# Patient Record
Sex: FEMALE | Race: WHITE | NOT HISPANIC OR LATINO | Employment: PART TIME | URBAN - METROPOLITAN AREA
[De-identification: names, ages, dates, MRNs, and addresses within clinical notes are randomized per-mention and may not be internally consistent; named-entity substitution may affect disease eponyms.]

---

## 2023-02-24 LAB — LAB MOLECULAR CA TECHNICAL NOTES: NORMAL

## 2023-03-28 LAB
ANION GAP IN SER/PLAS: 13 MMOL/L (ref 10–20)
CALCIUM (MG/DL) IN SER/PLAS: 9.5 MG/DL (ref 8.6–10.3)
CARBON DIOXIDE, TOTAL (MMOL/L) IN SER/PLAS: 23 MMOL/L (ref 21–32)
CHLORIDE (MMOL/L) IN SER/PLAS: 104 MMOL/L (ref 98–107)
CREATININE (MG/DL) IN SER/PLAS: 0.58 MG/DL (ref 0.5–1.05)
GFR FEMALE: >90 ML/MIN/1.73M2
GLUCOSE (MG/DL) IN SER/PLAS: 90 MG/DL (ref 74–99)
POTASSIUM (MMOL/L) IN SER/PLAS: 3.6 MMOL/L (ref 3.5–5.3)
SODIUM (MMOL/L) IN SER/PLAS: 136 MMOL/L (ref 136–145)
UREA NITROGEN (MG/DL) IN SER/PLAS: 6 MG/DL (ref 6–23)

## 2023-05-19 LAB — URINE CULTURE: NORMAL

## 2023-05-23 LAB
ERYTHROCYTE DISTRIBUTION WIDTH (RATIO) BY AUTOMATED COUNT: 12.9 % (ref 11.5–14.5)
ERYTHROCYTE MEAN CORPUSCULAR HEMOGLOBIN CONCENTRATION (G/DL) BY AUTOMATED: 31.2 G/DL (ref 32–36)
ERYTHROCYTE MEAN CORPUSCULAR VOLUME (FL) BY AUTOMATED COUNT: 89 FL (ref 80–100)
ERYTHROCYTES (10*6/UL) IN BLOOD BY AUTOMATED COUNT: 3.94 X10E12/L (ref 4–5.2)
GLUCOSE, 1 HR SCREEN, PREG: 145 MG/DL
HEMATOCRIT (%) IN BLOOD BY AUTOMATED COUNT: 34.9 % (ref 36–46)
HEMOGLOBIN (G/DL) IN BLOOD: 10.9 G/DL (ref 12–16)
LEUKOCYTES (10*3/UL) IN BLOOD BY AUTOMATED COUNT: 10.6 X10E9/L (ref 4.4–11.3)
PLATELETS (10*3/UL) IN BLOOD AUTOMATED COUNT: 293 X10E9/L (ref 150–450)
REFLEX ADDED, ANEMIA PANEL: ABNORMAL

## 2023-05-24 LAB
FERRITIN, PREGNANCY: 26 UG/L
FOLATE, SERUM, PREGNANCY: >24 NG/ML
IRON (UG/DL) IN SER/PLAS IN PREGNANCY: 52 UG/DL
IRON BINDING CAPACITY (UG/DL) IN PREGNANCY: >502 UG/DL
IRON SATURATION (%) IN PREGNANCY: ABNORMAL %
VITAMIN B12, PREGNANCY: 368 PG/ML

## 2023-06-20 ENCOUNTER — TELEPHONE (OUTPATIENT)
Dept: PRIMARY CARE | Facility: CLINIC | Age: 28
End: 2023-06-20
Payer: COMMERCIAL

## 2023-06-20 DIAGNOSIS — F41.9 ANXIETY: Primary | ICD-10-CM

## 2023-06-20 RX ORDER — VENLAFAXINE HYDROCHLORIDE 75 MG/1
75 CAPSULE, EXTENDED RELEASE ORAL DAILY
Qty: 90 CAPSULE | Refills: 0 | Status: SHIPPED | OUTPATIENT
Start: 2023-06-20 | End: 2023-09-18

## 2023-06-20 RX ORDER — VENLAFAXINE HYDROCHLORIDE 75 MG/1
75 CAPSULE, EXTENDED RELEASE ORAL
COMMUNITY
End: 2023-06-20 | Stop reason: SDUPTHER

## 2023-06-27 LAB
GLUCOSE THREE HOUR: 102 MG/DL
GLUCOSE TWO HOUR: 95 MG/DL
GLUCOSE, FASTING: 83 MG/DL
GLUCOSE, ONE HOUR: 163 MG/DL
GTTCM: NORMAL

## 2023-08-04 LAB — GROUP B STREP SCREEN: NORMAL

## 2023-08-18 ENCOUNTER — APPOINTMENT (OUTPATIENT)
Dept: LAB | Facility: LAB | Age: 28
End: 2023-08-18
Payer: COMMERCIAL

## 2023-08-18 LAB
ABO GROUP (TYPE) IN BLOOD: NORMAL
ANION GAP IN SER/PLAS: 17 MMOL/L (ref 10–20)
ANTIBODY SCREEN: NORMAL
CALCIUM (MG/DL) IN SER/PLAS: 9 MG/DL (ref 8.6–10.6)
CARBON DIOXIDE, TOTAL (MMOL/L) IN SER/PLAS: 21 MMOL/L (ref 21–32)
CHLORIDE (MMOL/L) IN SER/PLAS: 106 MMOL/L (ref 98–107)
CREATININE (MG/DL) IN SER/PLAS: 0.5 MG/DL (ref 0.5–1.05)
ERYTHROCYTE DISTRIBUTION WIDTH (RATIO) BY AUTOMATED COUNT: 15.3 % (ref 11.5–14.5)
ERYTHROCYTE MEAN CORPUSCULAR HEMOGLOBIN CONCENTRATION (G/DL) BY AUTOMATED: 30.7 G/DL (ref 32–36)
ERYTHROCYTE MEAN CORPUSCULAR VOLUME (FL) BY AUTOMATED COUNT: 85 FL (ref 80–100)
ERYTHROCYTES (10*6/UL) IN BLOOD BY AUTOMATED COUNT: 4.08 X10E12/L (ref 4–5.2)
GFR FEMALE: >90 ML/MIN/1.73M2
GLUCOSE (MG/DL) IN SER/PLAS: 79 MG/DL (ref 74–99)
HEMATOCRIT (%) IN BLOOD BY AUTOMATED COUNT: 34.5 % (ref 36–46)
HEMOGLOBIN (G/DL) IN BLOOD: 10.6 G/DL (ref 12–16)
LEUKOCYTES (10*3/UL) IN BLOOD BY AUTOMATED COUNT: 11 X10E9/L (ref 4.4–11.3)
NRBC (PER 100 WBCS) BY AUTOMATED COUNT: 0 /100 WBC (ref 0–0)
PLATELETS (10*3/UL) IN BLOOD AUTOMATED COUNT: 247 X10E9/L (ref 150–450)
POTASSIUM (MMOL/L) IN SER/PLAS: 4.3 MMOL/L (ref 3.5–5.3)
RH FACTOR: NORMAL
SODIUM (MMOL/L) IN SER/PLAS: 140 MMOL/L (ref 136–145)
SYPHILIS TOTAL AB: NONREACTIVE
THYROTROPIN (MIU/L) IN SER/PLAS BY DETECTION LIMIT <= 0.05 MIU/L: 3.22 MIU/L (ref 0.44–3.98)
THYROXINE (T4) FREE (NG/DL) IN SER/PLAS: 0.97 NG/DL (ref 0.78–1.48)
UREA NITROGEN (MG/DL) IN SER/PLAS: 7 MG/DL (ref 6–23)

## 2023-09-18 DIAGNOSIS — F41.9 ANXIETY: ICD-10-CM

## 2023-09-18 RX ORDER — VENLAFAXINE HYDROCHLORIDE 75 MG/1
75 CAPSULE, EXTENDED RELEASE ORAL DAILY
Qty: 90 CAPSULE | Refills: 0 | Status: SHIPPED | OUTPATIENT
Start: 2023-09-18 | End: 2023-11-06 | Stop reason: SDUPTHER

## 2023-09-29 PROBLEM — R00.2 PALPITATIONS: Status: ACTIVE | Noted: 2023-09-29

## 2023-09-29 PROBLEM — M75.81 TENDINITIS OF RIGHT ROTATOR CUFF: Status: ACTIVE | Noted: 2023-09-29

## 2023-09-29 PROBLEM — J18.9 COMMUNITY ACQUIRED PNEUMONIA: Status: ACTIVE | Noted: 2023-09-29

## 2023-09-29 PROBLEM — N89.8 VAGINAL DISCHARGE: Status: ACTIVE | Noted: 2023-09-29

## 2023-09-29 PROBLEM — R41.840 POOR CONCENTRATION: Status: ACTIVE | Noted: 2023-09-29

## 2023-09-29 PROBLEM — E53.8 VITAMIN B12 DEFICIENCY: Status: ACTIVE | Noted: 2023-09-29

## 2023-09-29 PROBLEM — G90.9 DYSFUNCTIONAL AUTONOMIC NERVOUS SYSTEM: Status: ACTIVE | Noted: 2023-09-29

## 2023-09-29 PROBLEM — O09.291: Status: ACTIVE | Noted: 2019-11-18

## 2023-09-29 PROBLEM — G93.40 ENCEPHALOPATHY: Status: ACTIVE | Noted: 2023-09-29

## 2023-09-29 PROBLEM — R35.0 INCREASED FREQUENCY OF URINATION: Status: ACTIVE | Noted: 2023-09-29

## 2023-09-29 PROBLEM — F41.9 ANXIETY AND DEPRESSION: Status: ACTIVE | Noted: 2023-08-23

## 2023-09-29 PROBLEM — R06.02 SHORTNESS OF BREATH: Status: ACTIVE | Noted: 2023-09-29

## 2023-09-29 PROBLEM — R26.9 ABNORMAL GAIT: Status: ACTIVE | Noted: 2023-09-29

## 2023-09-29 PROBLEM — F32.A ANXIETY AND DEPRESSION: Status: ACTIVE | Noted: 2023-08-23

## 2023-09-29 PROBLEM — N92.6 MISSED PERIOD: Status: ACTIVE | Noted: 2023-09-29

## 2023-09-29 PROBLEM — G93.9 DISORDER OF BRAIN: Status: ACTIVE | Noted: 2023-09-29

## 2023-09-29 PROBLEM — D64.9 ANEMIA: Status: ACTIVE | Noted: 2023-09-29

## 2023-09-29 PROBLEM — R41.89 IMPAIRED COGNITION: Status: ACTIVE | Noted: 2023-09-29

## 2023-09-29 PROBLEM — I47.11 INAPPROPRIATE SINUS TACHYCARDIA (CMS-HCC): Status: ACTIVE | Noted: 2023-09-29

## 2023-09-29 PROBLEM — U09.9 POST COVID-19 CONDITION, UNSPECIFIED: Status: ACTIVE | Noted: 2023-08-23

## 2023-09-29 PROBLEM — G47.9 SLEEP DIFFICULTIES: Status: ACTIVE | Noted: 2023-09-29

## 2023-09-29 PROBLEM — G90.1 DYSAUTONOMIA (MULTI): Status: ACTIVE | Noted: 2023-09-29

## 2023-09-29 PROBLEM — G83.10 PARESIS OF LOWER EXTREMITY (MULTI): Status: ACTIVE | Noted: 2023-09-29

## 2023-09-29 PROBLEM — I95.9 HYPOTENSION: Status: ACTIVE | Noted: 2023-09-29

## 2023-09-29 PROBLEM — G47.01 INSOMNIA DUE TO MEDICAL CONDITION: Status: ACTIVE | Noted: 2023-09-29

## 2023-09-29 PROBLEM — E55.9 VITAMIN D DEFICIENCY: Status: ACTIVE | Noted: 2023-09-29

## 2023-09-29 PROBLEM — B34.9 NONSPECIFIC SYNDROME SUGGESTIVE OF VIRAL ILLNESS: Status: ACTIVE | Noted: 2023-09-29

## 2023-09-29 PROBLEM — R00.0 SINUS TACHYCARDIA: Status: ACTIVE | Noted: 2023-09-29

## 2023-09-29 PROBLEM — R50.9 LOW GRADE FEVER: Status: ACTIVE | Noted: 2023-09-29

## 2023-09-29 PROBLEM — K21.9 GASTROESOPHAGEAL REFLUX DISEASE: Status: ACTIVE | Noted: 2023-09-29

## 2023-09-29 PROBLEM — E66.3 OVERWEIGHT WITH BODY MASS INDEX (BMI) 25.0-29.9: Status: ACTIVE | Noted: 2023-09-29

## 2023-09-29 PROBLEM — R20.0 NUMBNESS AND TINGLING: Status: ACTIVE | Noted: 2023-09-29

## 2023-09-29 PROBLEM — F90.0 ATTENTION DEFICIT HYPERACTIVITY DISORDER (ADHD), PREDOMINANTLY INATTENTIVE TYPE: Status: ACTIVE | Noted: 2023-09-29

## 2023-09-29 PROBLEM — E66.9 OBESITY: Status: ACTIVE | Noted: 2023-09-29

## 2023-09-29 PROBLEM — R41.3 MEMORY LOSS OF UNKNOWN CAUSE: Status: ACTIVE | Noted: 2023-09-29

## 2023-09-29 PROBLEM — S46.919A STRAIN OF SHOULDER: Status: ACTIVE | Noted: 2023-09-29

## 2023-09-29 PROBLEM — J45.30 MILD PERSISTENT ASTHMA WITHOUT COMPLICATION (HHS-HCC): Status: ACTIVE | Noted: 2021-02-17

## 2023-09-29 PROBLEM — R20.2 NUMBNESS AND TINGLING: Status: ACTIVE | Noted: 2023-09-29

## 2023-09-29 PROBLEM — M50.20 HERNIATION OF INTERVERTEBRAL DISC OF CERVICAL REGION: Status: ACTIVE | Noted: 2023-09-29

## 2023-09-29 RX ORDER — NORETHINDRONE 0.35 MG/1
1 TABLET ORAL DAILY
COMMUNITY
Start: 2023-08-22 | End: 2023-10-03

## 2023-09-29 RX ORDER — HYDROXYZINE HYDROCHLORIDE 25 MG/1
25 TABLET, FILM COATED ORAL NIGHTLY PRN
COMMUNITY
Start: 2020-12-08 | End: 2023-10-03

## 2023-09-29 RX ORDER — ALBUTEROL SULFATE 0.83 MG/ML
2.5 SOLUTION RESPIRATORY (INHALATION) EVERY 6 HOURS PRN
COMMUNITY
End: 2023-12-26

## 2023-09-29 RX ORDER — LEVALBUTEROL INHALATION SOLUTION 1.25 MG/3ML
3 SOLUTION RESPIRATORY (INHALATION) EVERY 8 HOURS
COMMUNITY
Start: 2021-07-30 | End: 2023-12-29 | Stop reason: WASHOUT

## 2023-09-29 RX ORDER — CEPHALEXIN 500 MG/1
500 CAPSULE ORAL 3 TIMES DAILY
COMMUNITY
Start: 2020-08-17 | End: 2023-10-03

## 2023-09-29 RX ORDER — METOCLOPRAMIDE HYDROCHLORIDE 5 MG/5ML
25 SOLUTION ORAL
COMMUNITY
End: 2023-10-03

## 2023-09-29 RX ORDER — BROMPHENIRAMINE MALEATE, PSEUDOEPHEDRINE HYDROCHLORIDE, AND DEXTROMETHORPHAN HYDROBROMIDE 2; 30; 10 MG/5ML; MG/5ML; MG/5ML
5 SYRUP ORAL 3 TIMES DAILY PRN
COMMUNITY
Start: 2019-09-11 | End: 2023-10-03

## 2023-09-29 RX ORDER — MAGNESIUM 200 MG
TABLET ORAL
COMMUNITY
Start: 2022-09-12 | End: 2023-10-03

## 2023-09-29 RX ORDER — TIZANIDINE 2 MG/1
TABLET ORAL
COMMUNITY
Start: 2021-02-01 | End: 2023-10-03

## 2023-09-29 RX ORDER — BUDESONIDE 0.5 MG/2ML
INHALANT ORAL
COMMUNITY
Start: 2021-02-17 | End: 2023-10-03

## 2023-09-29 RX ORDER — SERDEXMETHYLPHENIDATE AND DEXMETHYLPHENIDATE 7.8; 39.2 MG/1; MG/1
CAPSULE ORAL
COMMUNITY
Start: 2022-10-20 | End: 2023-10-03

## 2023-09-29 RX ORDER — MIRTAZAPINE 15 MG/1
15 TABLET, FILM COATED ORAL NIGHTLY
COMMUNITY
Start: 2021-08-24 | End: 2023-10-03

## 2023-09-29 RX ORDER — LANOLIN ALCOHOL/MO/W.PET/CERES
1 CREAM (GRAM) TOPICAL 2 TIMES DAILY
COMMUNITY
Start: 2023-06-27 | End: 2023-12-26 | Stop reason: WASHOUT

## 2023-09-29 RX ORDER — PANTOPRAZOLE SODIUM 40 MG/1
1 TABLET, DELAYED RELEASE ORAL DAILY
COMMUNITY
Start: 2023-08-01 | End: 2023-10-03

## 2023-09-29 RX ORDER — MECLIZINE HYDROCHLORIDE 50 MG/1
50 TABLET ORAL 3 TIMES DAILY
COMMUNITY
Start: 2021-12-08 | End: 2023-12-26

## 2023-09-29 RX ORDER — ONDANSETRON 4 MG/1
8 TABLET, FILM COATED ORAL 3 TIMES DAILY PRN
COMMUNITY
Start: 2021-12-08 | End: 2023-10-03

## 2023-09-29 RX ORDER — LEVALBUTEROL TARTRATE 45 UG/1
2 AEROSOL, METERED ORAL
COMMUNITY
Start: 2020-02-05

## 2023-09-29 RX ORDER — FERROUS GLUCONATE 324(37.5)
38 TABLET ORAL EVERY OTHER DAY
COMMUNITY
Start: 2020-01-16 | End: 2023-12-26

## 2023-09-29 RX ORDER — ONDANSETRON 8 MG/1
8 TABLET, ORALLY DISINTEGRATING ORAL EVERY 8 HOURS PRN
COMMUNITY
Start: 2021-12-08 | End: 2023-10-03

## 2023-09-29 RX ORDER — OMEPRAZOLE 20 MG/1
20 CAPSULE, DELAYED RELEASE ORAL
COMMUNITY
Start: 2022-07-05 | End: 2023-10-03

## 2023-09-29 RX ORDER — FLUTICASONE FUROATE AND VILANTEROL 200; 25 UG/1; UG/1
1 POWDER RESPIRATORY (INHALATION)
COMMUNITY
Start: 2020-12-07 | End: 2023-12-29 | Stop reason: WASHOUT

## 2023-09-29 RX ORDER — IPRATROPIUM BROMIDE AND ALBUTEROL SULFATE 2.5; .5 MG/3ML; MG/3ML
3 SOLUTION RESPIRATORY (INHALATION)
COMMUNITY
Start: 2020-01-02 | End: 2023-10-03

## 2023-09-29 RX ORDER — LEVALBUTEROL TARTRATE 45 UG/1
2 AEROSOL, METERED ORAL EVERY 6 HOURS PRN
COMMUNITY
Start: 2021-07-30 | End: 2023-10-03

## 2023-09-29 RX ORDER — FLUTICASONE PROPIONATE 50 MCG
1-2 SPRAY, SUSPENSION (ML) NASAL DAILY
COMMUNITY
Start: 2019-09-11 | End: 2023-10-03

## 2023-09-29 RX ORDER — FERROUS SULFATE TAB 325 MG (65 MG ELEMENTAL FE) 325 (65 FE) MG
2 TAB ORAL DAILY
COMMUNITY
End: 2024-05-14 | Stop reason: SDUPTHER

## 2023-09-29 RX ORDER — CHOLECALCIFEROL (VITAMIN D3) 25 MCG
1 TABLET,CHEWABLE ORAL DAILY
COMMUNITY
Start: 2020-01-16 | End: 2023-10-03

## 2023-09-29 RX ORDER — DESVENLAFAXINE 50 MG/1
50 TABLET, EXTENDED RELEASE ORAL DAILY
COMMUNITY
Start: 2019-05-06 | End: 2023-10-03

## 2023-09-29 RX ORDER — FAMOTIDINE 20 MG/1
20 TABLET, FILM COATED ORAL NIGHTLY
COMMUNITY
End: 2023-10-03

## 2023-09-29 RX ORDER — FLUDROCORTISONE ACETATE 0.1 MG/1
0.1 TABLET ORAL 2 TIMES DAILY
COMMUNITY
Start: 2022-02-08 | End: 2023-10-03

## 2023-09-29 RX ORDER — NORETHINDRONE ACETATE AND ETHINYL ESTRADIOL 1.5-30(21)
KIT ORAL
COMMUNITY
Start: 2016-10-01 | End: 2023-10-03

## 2023-09-29 RX ORDER — ACETAMINOPHEN 325 MG/1
TABLET ORAL
COMMUNITY
Start: 2020-06-28 | End: 2023-10-03

## 2023-09-29 RX ORDER — IRON PS COMPLEX/B12/FOLIC ACID 150-25-1
1 CAPSULE ORAL DAILY
COMMUNITY
End: 2023-10-03

## 2023-09-29 RX ORDER — DOCUSATE SODIUM 100 MG/1
CAPSULE, LIQUID FILLED ORAL
COMMUNITY
Start: 2023-08-22 | End: 2023-10-03

## 2023-09-29 RX ORDER — NORETHINDRONE ACETATE AND ETHINYL ESTRADIOL, AND FERROUS FUMARATE 1MG-20(24)
1 KIT ORAL
COMMUNITY
Start: 2019-06-24 | End: 2023-10-03

## 2023-09-29 RX ORDER — VIT C/E/ZN/COPPR/LUTEIN/ZEAXAN 250MG-90MG
50 CAPSULE ORAL 2 TIMES DAILY
COMMUNITY
Start: 2022-07-19

## 2023-09-29 RX ORDER — NITROFURANTOIN (MACROCRYSTALS) 100 MG/1
100 CAPSULE ORAL EVERY 12 HOURS
COMMUNITY
Start: 2020-01-27 | End: 2023-10-03

## 2023-09-29 RX ORDER — ACEBUTOLOL HYDROCHLORIDE 200 MG/1
200 CAPSULE ORAL 2 TIMES DAILY
COMMUNITY
End: 2024-04-18

## 2023-09-29 RX ORDER — IBUPROFEN 600 MG/1
TABLET ORAL
COMMUNITY
Start: 2020-06-28 | End: 2023-10-03

## 2023-09-29 RX ORDER — PROMETHAZINE HYDROCHLORIDE 12.5 MG/1
1 TABLET ORAL EVERY 6 HOURS PRN
COMMUNITY
Start: 2023-01-16 | End: 2023-10-03

## 2023-09-29 RX ORDER — METOCLOPRAMIDE 5 MG/1
5 TABLET ORAL 4 TIMES DAILY
COMMUNITY
Start: 2019-12-04 | End: 2023-10-03

## 2023-10-03 ENCOUNTER — OFFICE VISIT (OUTPATIENT)
Dept: OBSTETRICS AND GYNECOLOGY | Facility: CLINIC | Age: 28
End: 2023-10-03
Payer: COMMERCIAL

## 2023-10-03 ENCOUNTER — TELEPHONE (OUTPATIENT)
Dept: OBSTETRICS AND GYNECOLOGY | Facility: CLINIC | Age: 28
End: 2023-10-03

## 2023-10-03 VITALS — SYSTOLIC BLOOD PRESSURE: 116 MMHG | BODY MASS INDEX: 29.88 KG/M2 | DIASTOLIC BLOOD PRESSURE: 78 MMHG | WEIGHT: 196.5 LBS

## 2023-10-03 NOTE — PROGRESS NOTES
Postpartum Progress Note    Assessment/Plan   Sienna Cabrera is a 27 y.o., G3.P3 presenting for 6 wk postpartum visit after .  Doing well.  Infant doing well.  Up to date on pap.  Mood is good.  Declines birth control.   RTC 6-9 months for annual exam or earlier with any concerns.    Raeann Jimenez MD    Active Problems:  There are no active Hospital Problems.    Problem List       No episode was linked to this visit.          Hospital course: no complications       Subjective   She reports no breast or nursing problems  She denies emotional concerns today   Her plan for contraception is none     Patient feeling well.  Infant doing well.     Objective   Allergies:   Escitalopram, Lactose, and Metoprolol         Last Vitals:  Temp Pulse Resp BP MAP Pulse Ox         116/78           Physical Exam:  General: Examination reveals a well developed, well nourished, female, in no acute distress. She is alert and cooperative.  Perineum: well approximated and and well healed.    Lab Data:  Labs in chart were reviewed.

## 2023-10-15 ENCOUNTER — APPOINTMENT (OUTPATIENT)
Dept: RADIOLOGY | Facility: HOSPITAL | Age: 28
End: 2023-10-15
Payer: COMMERCIAL

## 2023-10-15 ENCOUNTER — HOSPITAL ENCOUNTER (EMERGENCY)
Facility: HOSPITAL | Age: 28
Discharge: HOME | End: 2023-10-15
Attending: PHYSICIAN ASSISTANT
Payer: COMMERCIAL

## 2023-10-15 VITALS
BODY MASS INDEX: 29.55 KG/M2 | WEIGHT: 195 LBS | RESPIRATION RATE: 18 BRPM | DIASTOLIC BLOOD PRESSURE: 79 MMHG | SYSTOLIC BLOOD PRESSURE: 140 MMHG | TEMPERATURE: 97.8 F | HEIGHT: 68 IN | OXYGEN SATURATION: 97 % | HEART RATE: 74 BPM

## 2023-10-15 DIAGNOSIS — R07.89 ACUTE CHEST WALL PAIN: ICD-10-CM

## 2023-10-15 DIAGNOSIS — R07.89 OTHER CHEST PAIN: Primary | ICD-10-CM

## 2023-10-15 DIAGNOSIS — E87.6 HYPOKALEMIA: ICD-10-CM

## 2023-10-15 LAB
ALBUMIN SERPL BCP-MCNC: 4.8 G/DL (ref 3.4–5)
ALP SERPL-CCNC: 118 U/L (ref 33–110)
ALT SERPL W P-5'-P-CCNC: 46 U/L (ref 7–45)
ANION GAP SERPL CALC-SCNC: 15 MMOL/L (ref 10–20)
APTT PPP: 26 SECONDS (ref 27–38)
AST SERPL W P-5'-P-CCNC: 50 U/L (ref 9–39)
B-HCG SERPL-ACNC: <2 MIU/ML
BASOPHILS # BLD AUTO: 0.07 X10*3/UL (ref 0–0.1)
BASOPHILS NFR BLD AUTO: 0.7 %
BILIRUB SERPL-MCNC: 0.4 MG/DL (ref 0–1.2)
BUN SERPL-MCNC: 9 MG/DL (ref 6–23)
CALCIUM SERPL-MCNC: 9.9 MG/DL (ref 8.6–10.3)
CARDIAC TROPONIN I PNL SERPL HS: <3 NG/L (ref 0–13)
CARDIAC TROPONIN I PNL SERPL HS: <3 NG/L (ref 0–13)
CHLORIDE SERPL-SCNC: 102 MMOL/L (ref 98–107)
CO2 SERPL-SCNC: 25 MMOL/L (ref 21–32)
CREAT SERPL-MCNC: 0.75 MG/DL (ref 0.5–1.05)
D DIMER PPP FEU-MCNC: 353 NG/ML FEU
EOSINOPHIL # BLD AUTO: 0.21 X10*3/UL (ref 0–0.7)
EOSINOPHIL NFR BLD AUTO: 2.1 %
ERYTHROCYTE [DISTWIDTH] IN BLOOD BY AUTOMATED COUNT: 14.2 % (ref 11.5–14.5)
GFR SERPL CREATININE-BSD FRML MDRD: >90 ML/MIN/1.73M*2
GLUCOSE SERPL-MCNC: 99 MG/DL (ref 74–99)
HCT VFR BLD AUTO: 43.2 % (ref 36–46)
HGB BLD-MCNC: 14.1 G/DL (ref 12–16)
IMM GRANULOCYTES # BLD AUTO: 0.03 X10*3/UL (ref 0–0.7)
IMM GRANULOCYTES NFR BLD AUTO: 0.3 % (ref 0–0.9)
INR PPP: 0.9 (ref 0.9–1.1)
LIPASE SERPL-CCNC: 47 U/L (ref 9–82)
LYMPHOCYTES # BLD AUTO: 2.74 X10*3/UL (ref 1.2–4.8)
LYMPHOCYTES NFR BLD AUTO: 27.9 %
MAGNESIUM SERPL-MCNC: 1.86 MG/DL (ref 1.6–2.4)
MCH RBC QN AUTO: 26.8 PG (ref 26–34)
MCHC RBC AUTO-ENTMCNC: 32.6 G/DL (ref 32–36)
MCV RBC AUTO: 82 FL (ref 80–100)
MONOCYTES # BLD AUTO: 0.44 X10*3/UL (ref 0.1–1)
MONOCYTES NFR BLD AUTO: 4.5 %
NEUTROPHILS # BLD AUTO: 6.34 X10*3/UL (ref 1.2–7.7)
NEUTROPHILS NFR BLD AUTO: 64.5 %
NRBC BLD-RTO: 0 /100 WBCS (ref 0–0)
PLATELET # BLD AUTO: 287 X10*3/UL (ref 150–450)
PMV BLD AUTO: 10.1 FL (ref 7.5–11.5)
POTASSIUM SERPL-SCNC: 3.4 MMOL/L (ref 3.5–5.3)
PROT SERPL-MCNC: 7.7 G/DL (ref 6.4–8.2)
PROTHROMBIN TIME: 10.2 SECONDS (ref 9.8–12.8)
RBC # BLD AUTO: 5.27 X10*6/UL (ref 4–5.2)
SODIUM SERPL-SCNC: 139 MMOL/L (ref 136–145)
WBC # BLD AUTO: 9.8 X10*3/UL (ref 4.4–11.3)

## 2023-10-15 PROCEDURE — 36415 COLL VENOUS BLD VENIPUNCTURE: CPT

## 2023-10-15 PROCEDURE — 85025 COMPLETE CBC W/AUTO DIFF WBC: CPT

## 2023-10-15 PROCEDURE — 96374 THER/PROPH/DIAG INJ IV PUSH: CPT

## 2023-10-15 PROCEDURE — 84484 ASSAY OF TROPONIN QUANT: CPT

## 2023-10-15 PROCEDURE — 84702 CHORIONIC GONADOTROPIN TEST: CPT

## 2023-10-15 PROCEDURE — 99284 EMERGENCY DEPT VISIT MOD MDM: CPT | Mod: 25

## 2023-10-15 PROCEDURE — 2500000004 HC RX 250 GENERAL PHARMACY W/ HCPCS (ALT 636 FOR OP/ED)

## 2023-10-15 PROCEDURE — 85730 THROMBOPLASTIN TIME PARTIAL: CPT

## 2023-10-15 PROCEDURE — 80053 COMPREHEN METABOLIC PANEL: CPT

## 2023-10-15 PROCEDURE — 71045 X-RAY EXAM CHEST 1 VIEW: CPT | Performed by: STUDENT IN AN ORGANIZED HEALTH CARE EDUCATION/TRAINING PROGRAM

## 2023-10-15 PROCEDURE — 2500000001 HC RX 250 WO HCPCS SELF ADMINISTERED DRUGS (ALT 637 FOR MEDICARE OP)

## 2023-10-15 PROCEDURE — 71045 X-RAY EXAM CHEST 1 VIEW: CPT

## 2023-10-15 PROCEDURE — 96361 HYDRATE IV INFUSION ADD-ON: CPT

## 2023-10-15 PROCEDURE — 85379 FIBRIN DEGRADATION QUANT: CPT

## 2023-10-15 PROCEDURE — C9113 INJ PANTOPRAZOLE SODIUM, VIA: HCPCS

## 2023-10-15 PROCEDURE — 96375 TX/PRO/DX INJ NEW DRUG ADDON: CPT

## 2023-10-15 PROCEDURE — 83735 ASSAY OF MAGNESIUM: CPT

## 2023-10-15 PROCEDURE — 83690 ASSAY OF LIPASE: CPT

## 2023-10-15 RX ORDER — KETOROLAC TROMETHAMINE 30 MG/ML
30 INJECTION, SOLUTION INTRAMUSCULAR; INTRAVENOUS ONCE
Status: COMPLETED | OUTPATIENT
Start: 2023-10-15 | End: 2023-10-15

## 2023-10-15 RX ORDER — POTASSIUM CHLORIDE 1.5 G/1.58G
40 POWDER, FOR SOLUTION ORAL ONCE
Status: COMPLETED | OUTPATIENT
Start: 2023-10-15 | End: 2023-10-15

## 2023-10-15 RX ORDER — PANTOPRAZOLE SODIUM 40 MG/10ML
40 INJECTION, POWDER, LYOPHILIZED, FOR SOLUTION INTRAVENOUS ONCE
Status: COMPLETED | OUTPATIENT
Start: 2023-10-15 | End: 2023-10-15

## 2023-10-15 RX ADMIN — SODIUM CHLORIDE 500 ML: 9 INJECTION, SOLUTION INTRAVENOUS at 04:45

## 2023-10-15 RX ADMIN — POTASSIUM CHLORIDE 40 MEQ: 1.5 POWDER, FOR SOLUTION ORAL at 05:51

## 2023-10-15 RX ADMIN — PANTOPRAZOLE SODIUM 40 MG: 40 INJECTION, POWDER, FOR SOLUTION INTRAVENOUS at 04:46

## 2023-10-15 RX ADMIN — KETOROLAC TROMETHAMINE 30 MG: 30 INJECTION, SOLUTION INTRAMUSCULAR at 05:38

## 2023-10-15 SDOH — HEALTH STABILITY: MENTAL HEALTH: BEHAVIORS/MOOD: COOPERATIVE;ANXIOUS

## 2023-10-15 ASSESSMENT — PAIN DESCRIPTION - ONSET: ONSET: AWAKENED FROM SLEEP

## 2023-10-15 ASSESSMENT — PAIN - FUNCTIONAL ASSESSMENT: PAIN_FUNCTIONAL_ASSESSMENT: 0-10

## 2023-10-15 ASSESSMENT — COLUMBIA-SUICIDE SEVERITY RATING SCALE - C-SSRS
1. IN THE PAST MONTH, HAVE YOU WISHED YOU WERE DEAD OR WISHED YOU COULD GO TO SLEEP AND NOT WAKE UP?: NO
2. HAVE YOU ACTUALLY HAD ANY THOUGHTS OF KILLING YOURSELF?: NO
6. HAVE YOU EVER DONE ANYTHING, STARTED TO DO ANYTHING, OR PREPARED TO DO ANYTHING TO END YOUR LIFE?: NO

## 2023-10-15 ASSESSMENT — PAIN SCALES - GENERAL
PAINLEVEL_OUTOF10: 8
PAINLEVEL_OUTOF10: 4
PAINLEVEL_OUTOF10: 8
PAINLEVEL_OUTOF10: 8

## 2023-10-15 ASSESSMENT — LIFESTYLE VARIABLES
HAVE PEOPLE ANNOYED YOU BY CRITICIZING YOUR DRINKING: NO
HAVE YOU EVER FELT YOU SHOULD CUT DOWN ON YOUR DRINKING: NO
EVER HAD A DRINK FIRST THING IN THE MORNING TO STEADY YOUR NERVES TO GET RID OF A HANGOVER: NO
REASON UNABLE TO ASSESS: YES
EVER FELT BAD OR GUILTY ABOUT YOUR DRINKING: NO

## 2023-10-15 ASSESSMENT — PAIN DESCRIPTION - DESCRIPTORS
DESCRIPTORS: DISCOMFORT
DESCRIPTORS: SHARP;ACHING;BURNING

## 2023-10-15 ASSESSMENT — PAIN DESCRIPTION - LOCATION: LOCATION: CHEST

## 2023-10-15 NOTE — PROGRESS NOTES
Emergency Medicine Transition of Care Note.    I received Sienna Cabrera in signout from .  Please see the previous ED provider note for all HPI, PE and MDM up to the time of signout at 6a. This is in addition to the primary record.    In brief Sienna Cabrera is an 27 y.o. female presenting for   Chief Complaint   Patient presents with    Anxiety     Hx of anxiety.  States it woke her up out of her sleep.     At the time of signout we were awaitinnd Troponin    Diagnoses as of 10/15/23 0829   Other chest pain   Hypokalemia   Acute chest wall pain       Medical Decision Making    HPI:    Differential diagnosis:    Pertinent physical exam:    Laboratory results:    Radiologic results:    EKG results: Please see procedure note    ED course and treatment: In the emergency department CAT scan of the chest was performed demonstrating no evidence of pulmonary embolus.  EKG demonstrates no ischemic changes.  2 troponins were both within normal limits.  I carefully reviewed labs and radiologic findings with patient demonstrating that this does not appear to be cardiac related.  Encourage patient to take Motrin and/or Tylenol for discomfort.  Patient in agreement with findings diagnosis and plan    Diagnosis: Chest Wall pain    Disposition: Home    Social determinants of health: None    *This documentation is completed using the Dragon Dictation system. There may be spelling and/or grammatical errors that were not corrected prior to final submission. I apologize for any inconvenience.*        Final diagnoses:   [R07.89] Other chest pain   [E87.6] Hypokalemia           Procedure  Procedures    Billy Suero PA-C Sienna Cabrera is a 27 y.o. female on day 0 of admission presenting with No Principal Problem: There is no principal problem currently on the Problem List. Please update the Problem List and refresh..    Subjective   Chest wall pain       Objective     Physical Exam    Last Recorded Vitals  Blood  "pressure 140/89, pulse 88, temperature 36.6 °C (97.8 °F), temperature source Oral, resp. rate 18, height 1.727 m (5' 8\"), weight 88.5 kg (195 lb), SpO2 97 %.  Intake/Output last 3 Shifts:  No intake/output data recorded.    Relevant Results                             Assessment/Plan   Active Problems:  There are no active Hospital Problems.    Following repeat troponin and CAT scan patient will be reevaluated       I spent 30 minutes in the professional and overall care of this patient.      Billy Suero PA-C      "

## 2023-10-15 NOTE — ED PROVIDER NOTES
"HPI   Chief Complaint   Patient presents with   • Anxiety     Hx of anxiety.  States it woke her up out of her sleep.       History provided by: Patient    Limitations to history: None    CC: Chest pain    HPI: 27-year-old female presents the emergency department to be evaluated for chest pain.  She says the pain woke her up from her sleep.  She characterized the pain as \"sharp \"and localized to her sternum.  States that it goes into her back and that it is worse when she lays down and better when she leans forward.  She thought it had something to do with reflux since her pain was also considered as burning, she took a Tums and ibuprofen with little to no relief.  She denies shortness of breath, cough, hemoptysis.  She denies history of ACS, she is never required a stress test.  Does have family history of cardiac disease.  She denies history of heart failure and denies pain or swelling in the extremities.  She denies history of DVTs.  She denies recent plane flights or the use of OCPs however she did deliver a healthy child 7 weeks ago.  Denies recent illnesses or exposure to sick contacts.  Denies fever and chills.  Denies weakness and fatigue.  Denies all abdominal and  complaints denies hematuria, dysuria, urgency, frequency, blood in the urine or stool.  Denies vaginal bleeding or discharge.  Denies COPD/asthma or use of breathing treatments.  Denies history of tobacco.  Denies all other systemic symptoms.  She does have a history of anxiety but denies feeling anxious currently or having a history of panic attacks.    ROS: Negative unless mentioned in HPI    Social Hx: Denies tobacco, alcohol, drug use    Medical Hx: Denies history of chronic medical conditions medication use.  Allergy to lactose and metoprolol.  Immunizations are up-to-date.                            No data recorded                Patient History   Past Medical History:   Diagnosis Date   • Acute respiratory distress syndrome (CMS/HCC) " 05/07/2021    ARDS (adult respiratory distress syndrome)   • Encounter for gynecological examination (general) (routine) without abnormal findings     Pap test, as part of routine gynecological examination   • Infectious mononucleosis, unspecified without complication 04/01/2013    Infectious mononucleosis     Past Surgical History:   Procedure Laterality Date   • MR HEAD ANGIO WO IV CONTRAST  12/8/2021    MR HEAD ANGIO WO IV CONTRAST 12/8/2021 [facility]   • MR NECK ANGIO WO IV CONTRAST  12/8/2021    MR NECK ANGIO WO IV CONTRAST 12/8/2021 [facility]   • OTHER SURGICAL HISTORY  05/06/2019    Tonsillectomy with adenoidectomy     Family History   Problem Relation Name Age of Onset   • Hyperlipidemia Mother     • Other (cardiomegaly) Father     • Cardiomyopathy Father     • Heart failure Father     • Other (mitral valve disorder) Father     • Heart attack Father     • Sleep apnea Father     • Coronary artery disease Father     • Other (mechanical aortic valve replacement) Father     • Other (Coromary artery stent placement) Father     • Other (presence of combination internal cardiac defibrillator ICD and pacemaker) Father     • No Known Problems Brother       Social History     Tobacco Use   • Smoking status: Not on file   • Smokeless tobacco: Not on file   Substance Use Topics   • Alcohol use: Not on file   • Drug use: Not on file       Physical Exam   ED Triage Vitals [10/15/23 0404]   Temp Heart Rate Resp BP   35.9 °C (96.6 °F) 94 16 (!) 154/101      SpO2 Temp Source Heart Rate Source Patient Position   98 % Temporal -- --      BP Location FiO2 (%)     -- --       Physical Exam    ED Course & MDM   Diagnoses as of 10/15/23 0551   Other chest pain   Hypokalemia     Physical exam:    Constitutional: Patient is well-nourished and well-developed. Appears to be uncomfortable and is tearful..  Oriented to person, place, time, and situation.    HEENT: Head is normocephalic, atraumatic. Patient's airway  is patent.  Tympanic membranes are clear bilaterally.  Nasal mucosa clear.  Mouth with normal mucosa.  Throat is not erythematous and there are no oropharyngeal exudates, uvula is midline.  No obvious facial deformities.    Eyes: Clear bilaterally.  Pupils are equal round and reactive to light and accommodation.  Extraocular movements intact.      Cardiac: Regular rate, regular rhythm.  Heart sounds S1, S2.  No murmurs, rubs, or gallops.  PMI nondisplaced.  No JVD.    Respiratory: 98% on room air.  Regular respiratory rate and effort.  Breath sounds are clear and equal bilaterally, no adventitious lung sounds.  Patient is speaking in full sentences and is in no apparent respiratory distress. No use of accessory muscles.      Gastrointestinal: Abdomen is soft, nondistended, and nontender.  There are no obvious deformities.  No rebound tenderness or guarding.  Bowel sounds are normal active.    Genitourinary: No CVA or flank tenderness.    Musculoskeletal: No reproducible tenderness.  No obvious skin or bony deformities.  Patient has equal range of motion in all extremities and no strength deficiencies.  No muscle or joint tenderness. No back or neck tenderness.  Capillary refill less than 3 seconds.  Strong peripheral pulses.  No sensory deficits.  No peripheral edema or erythema.  No calf tenderness.  Negative Homans' sign.    Neurological: Patient is alert and oriented.  No focal deficits.  5/5 strength in all extremities.  Cranial nerves II through XII intact. GCS15.     Skin: Skin is normal color for race and is warm, dry, and intact.  No evidence of trauma.  No lesions, rashes, bruising, jaundice, or masses.    Psych: Appropriate mood and affect.  No apparent risk to self or others.    Heme/lymph: No adenopathy, lymphadenopathy, or splenomegaly    Physical exam is otherwise negative unless stated above or in history of present illness.    Patient updated on plan for lab testing, IV insertion, radiology imaging,  and medications to be administered while in the ER (if indicated). Patient updated on expected wait times for testing and results. Patient provided my name and told to ask any staff member for questions or concerns if they should arise. Electronic medical record reviewed.     MDM    Upon initial assessment, patient appears to be uncomfortable and is tearful.    Patient presented to the emergency department with the chief complaint of chest pain.  Patient's breath sounds are clear and equal bilaterally, 98% on room air.  No muffled heart sounds, JVD, murmur.  No findings of just pneumothorax pericardial effusion/tamponade.  No peripheral edema or erythema to suggest right-sided heart failure or DVT.  No history or physical exam findings of suggest a AAA.  Patient was given or on arrival to the emergency department, vital signs were within normal limits    IV Toradol and IV Protonix for her symptoms.  Work-up initially including basic blood work, EKG and troponin, D-dimer given the patient's recent delivery, magnesium, coagulation screen.  Patient's EKG was performed at 427 interpreted by me.  Normal sinus rhythm 80 bpm.  No ST elevation or depression.  Nonspecific T wave inversion in lead V1 and III.  No sign of acute ischemia arrhythmia.  No findings that suggest pericarditis.    Original troponin is within normal limits.  Pregnancy test, lactate, lipase all within normal limits lipase, magnesium within normal limits.  Coagulations given to the normal limits.  D-dimer is negative.  CBC shows no leukocytosis or anemia.  Alk phos is 118.  AST is 15 ALT is 46.  Patient has no abdominal tenderness on palpation and no abdominal complaints.  Likely from fatty liver disease.  Chest x-ray feels no pneumonia or pneumothorax.  Awaiting repeat troponin results and disposition.    This patient was signed out to Ej Suero PA-C at approximately 6 AM.  We discussed the patient's case including history and physical exam.   Discussed medications given as well as lab and imaging modality selected and respective results.  Agreeable plan of care.  Awaiting repeat troponin results and disposition.    This note was dictated using a speech recognition program.  While an attempt was made at proof-reading to minimize errors, minor errors in transcription may be presen    Medical Decision Making    Procedure  Procedures     Jorgito Christianson PA-C  10/15/23 0538

## 2023-11-02 PROBLEM — S46.919A STRAIN OF SHOULDER: Status: RESOLVED | Noted: 2023-09-29 | Resolved: 2023-11-02

## 2023-11-02 PROBLEM — J18.9 COMMUNITY ACQUIRED PNEUMONIA: Status: RESOLVED | Noted: 2023-09-29 | Resolved: 2023-11-02

## 2023-11-02 PROBLEM — B34.9 NONSPECIFIC SYNDROME SUGGESTIVE OF VIRAL ILLNESS: Status: RESOLVED | Noted: 2023-09-29 | Resolved: 2023-11-02

## 2023-11-02 PROBLEM — R35.0 INCREASED FREQUENCY OF URINATION: Status: RESOLVED | Noted: 2023-09-29 | Resolved: 2023-11-02

## 2023-11-02 PROBLEM — G47.9 SLEEP DIFFICULTIES: Status: RESOLVED | Noted: 2023-09-29 | Resolved: 2023-11-02

## 2023-11-02 PROBLEM — O09.291: Status: RESOLVED | Noted: 2019-11-18 | Resolved: 2023-11-02

## 2023-11-02 PROBLEM — N89.8 VAGINAL DISCHARGE: Status: RESOLVED | Noted: 2023-09-29 | Resolved: 2023-11-02

## 2023-11-02 PROBLEM — N92.6 MISSED PERIOD: Status: RESOLVED | Noted: 2023-09-29 | Resolved: 2023-11-02

## 2023-11-02 PROBLEM — R50.9 LOW GRADE FEVER: Status: RESOLVED | Noted: 2023-09-29 | Resolved: 2023-11-02

## 2023-11-03 PROBLEM — R00.0 SINUS TACHYCARDIA: Status: RESOLVED | Noted: 2023-09-29 | Resolved: 2023-11-03

## 2023-11-06 ENCOUNTER — OFFICE VISIT (OUTPATIENT)
Dept: PRIMARY CARE | Facility: CLINIC | Age: 28
End: 2023-11-06
Payer: COMMERCIAL

## 2023-11-06 VITALS
HEART RATE: 92 BPM | BODY MASS INDEX: 30.31 KG/M2 | TEMPERATURE: 97.7 F | RESPIRATION RATE: 16 BRPM | DIASTOLIC BLOOD PRESSURE: 62 MMHG | WEIGHT: 200 LBS | SYSTOLIC BLOOD PRESSURE: 126 MMHG | HEIGHT: 68 IN | OXYGEN SATURATION: 98 %

## 2023-11-06 DIAGNOSIS — F32.A ANXIETY AND DEPRESSION: ICD-10-CM

## 2023-11-06 DIAGNOSIS — Z23 NEED FOR VACCINATION: ICD-10-CM

## 2023-11-06 DIAGNOSIS — F90.0 ATTENTION DEFICIT HYPERACTIVITY DISORDER (ADHD), PREDOMINANTLY INATTENTIVE TYPE: ICD-10-CM

## 2023-11-06 DIAGNOSIS — Z00.00 ROUTINE GENERAL MEDICAL EXAMINATION AT A HEALTH CARE FACILITY: Primary | ICD-10-CM

## 2023-11-06 DIAGNOSIS — Z79.899 MEDICATION MANAGEMENT: ICD-10-CM

## 2023-11-06 DIAGNOSIS — F41.9 ANXIETY AND DEPRESSION: ICD-10-CM

## 2023-11-06 DIAGNOSIS — F41.9 ANXIETY: ICD-10-CM

## 2023-11-06 DIAGNOSIS — Z30.9 ENCOUNTER FOR CONTRACEPTIVE MANAGEMENT, UNSPECIFIED TYPE: ICD-10-CM

## 2023-11-06 DIAGNOSIS — J45.30 MILD PERSISTENT ASTHMA WITHOUT COMPLICATION (HHS-HCC): ICD-10-CM

## 2023-11-06 DIAGNOSIS — G90.1 DYSAUTONOMIA (MULTI): ICD-10-CM

## 2023-11-06 DIAGNOSIS — G83.10 PARESIS OF LOWER EXTREMITY (MULTI): ICD-10-CM

## 2023-11-06 LAB
AMPHETAMINES UR QL SCN: NORMAL
BARBITURATES UR QL SCN: NORMAL
BENZODIAZ UR QL SCN: NORMAL
BZE UR QL SCN: NORMAL
CANNABINOIDS UR QL SCN: NORMAL
FENTANYL+NORFENTANYL UR QL SCN: NORMAL
OPIATES UR QL SCN: NORMAL
OXYCODONE+OXYMORPHONE UR QL SCN: NORMAL
PCP UR QL SCN: NORMAL

## 2023-11-06 PROCEDURE — 1036F TOBACCO NON-USER: CPT | Performed by: FAMILY MEDICINE

## 2023-11-06 PROCEDURE — 90471 IMMUNIZATION ADMIN: CPT | Performed by: FAMILY MEDICINE

## 2023-11-06 PROCEDURE — 80307 DRUG TEST PRSMV CHEM ANLYZR: CPT

## 2023-11-06 PROCEDURE — 99395 PREV VISIT EST AGE 18-39: CPT | Performed by: FAMILY MEDICINE

## 2023-11-06 PROCEDURE — 90480 ADMN SARSCOV2 VAC 1/ONLY CMP: CPT | Performed by: FAMILY MEDICINE

## 2023-11-06 PROCEDURE — 90686 IIV4 VACC NO PRSV 0.5 ML IM: CPT | Performed by: FAMILY MEDICINE

## 2023-11-06 PROCEDURE — 91320 SARSCV2 VAC 30MCG TRS-SUC IM: CPT | Performed by: FAMILY MEDICINE

## 2023-11-06 RX ORDER — VENLAFAXINE HYDROCHLORIDE 75 MG/1
75 CAPSULE, EXTENDED RELEASE ORAL DAILY
Qty: 90 CAPSULE | Refills: 3 | Status: SHIPPED | OUTPATIENT
Start: 2023-11-06 | End: 2023-11-06 | Stop reason: SDUPTHER

## 2023-11-06 RX ORDER — NORETHINDRONE 0.35 MG/1
1 TABLET ORAL DAILY
Qty: 90 TABLET | Refills: 3 | Status: SHIPPED | OUTPATIENT
Start: 2023-11-06 | End: 2024-02-06 | Stop reason: ALTCHOICE

## 2023-11-06 RX ORDER — VENLAFAXINE HYDROCHLORIDE 75 MG/1
150 CAPSULE, EXTENDED RELEASE ORAL 2 TIMES DAILY
Qty: 180 CAPSULE | Refills: 1 | Status: SHIPPED | OUTPATIENT
Start: 2023-11-06 | End: 2024-05-14 | Stop reason: SDUPTHER

## 2023-11-06 NOTE — PROGRESS NOTES
Subjective   Patient ID: Sienna Cabrera is a 28 y.o. female who presents for Annual Exam.  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    Hypotension    Impaired cognition    Abnormal gait    Inappropriate sinus tachycardia    Insomnia due to medical condition    Macrosomia    Memory loss of unknown cause    Mild persistent asthma without complication    Numbness and tingling    Obesity    Overweight with body mass index (BMI) 25.0-29.9    Palpitations    Poor concentration    Post covid-19 condition, unspecified    Shortness of breath    Tendinitis of right rotator cuff    Vitamin D deficiency    Paresis of lower extremity (CMS/HCC)       Past Surgical History:   Procedure Laterality Date    MR HEAD ANGIO WO IV CONTRAST  12/08/2021    MR HEAD ANGIO WO IV CONTRAST 12/8/2021 [facility]    MR NECK ANGIO WO IV CONTRAST  12/08/2021    MR NECK ANGIO WO IV CONTRAST 12/8/2021 [facility]    TONSILLECTOMY      with adenoidectomy       Review of Systems  This patient has   NO history of recent Covid nor flu symptoms,  NO Fever nor chills,  NO Chest pain, shortness of breath nor paroxysmal nocturnal dyspnea,  NO Nausea, vomiting, nor diarrhea,  NO Hematochezia nor melena,  NO Dysuria, hematuria, nor new incontinence issues  NO new severe headaches nor neurological complaints,  NO new issues with anxiety nor depression nor new psychiatric complaints,  NO suicidal nor homicidal ideations.     OBJECTIVE:  /62   Pulse 92   Temp 36.5 °C (97.7 °F) (Temporal)   Resp  "16   Ht 1.727 m (5' 8\")   Wt 90.7 kg (200 lb)   LMP 11/05/2023 (Exact Date)   SpO2 98%   BMI 30.41 kg/m²      General:  alert, oriented, no acute distress.  No obvious skin rashes noted.   No gait disturbance noted.    Mood is pleasant, not tearful, no signs of emotional distress.  Not appearing intoxicated or altered.   No voiced delusions,   Normal, appropriate behavior.    HEENT: Normocephalic, atraumatic,   Pupils round, reactive to light  Extraocular motions intact and wnl  Tympanic membranes normal    Neck: no nuchal rigidity  No masses palpable.  No carotid bruits.  No thyromegaly.    Respiratory: Equal breath sounds  No wheezes,    rales,    nor rhonchi  No respiratory distress.    Heart: Regular rate and rhythm, no    murmurs  no rubs/gallops    Abdomen: no masses palpable, nontender, no rebound nor guarding.    Extremities: NO cyanosis noted, no clubbing.   No edema noted.  2+dorsalis pedis pulses.    Normal-not antalgic, steady gait.    Admission on 10/15/2023, Discharged on 10/15/2023   Component Date Value Ref Range Status    WBC 10/15/2023 9.8  4.4 - 11.3 x10*3/uL Final    nRBC 10/15/2023 0.0  0.0 - 0.0 /100 WBCs Final    RBC 10/15/2023 5.27 (H)  4.00 - 5.20 x10*6/uL Final    Hemoglobin 10/15/2023 14.1  12.0 - 16.0 g/dL Final    Hematocrit 10/15/2023 43.2  36.0 - 46.0 % Final    MCV 10/15/2023 82  80 - 100 fL Final    MCH 10/15/2023 26.8  26.0 - 34.0 pg Final    MCHC 10/15/2023 32.6  32.0 - 36.0 g/dL Final    RDW 10/15/2023 14.2  11.5 - 14.5 % Final    Platelets 10/15/2023 287  150 - 450 x10*3/uL Final    MPV 10/15/2023 10.1  7.5 - 11.5 fL Final    Neutrophils % 10/15/2023 64.5  40.0 - 80.0 % Final    Immature Granulocytes %, Automated 10/15/2023 0.3  0.0 - 0.9 % Final    Immature Granulocyte Count (IG) includes promyelocytes, myelocytes and metamyelocytes but does not include bands. Percent differential counts (%) should be interpreted in the context of the absolute cell counts (cells/UL).    " Lymphocytes % 10/15/2023 27.9  13.0 - 44.0 % Final    Monocytes % 10/15/2023 4.5  2.0 - 10.0 % Final    Eosinophils % 10/15/2023 2.1  0.0 - 6.0 % Final    Basophils % 10/15/2023 0.7  0.0 - 2.0 % Final    Neutrophils Absolute 10/15/2023 6.34  1.20 - 7.70 x10*3/uL Final    Percent differential counts (%) should be interpreted in the context of the absolute cell counts (cells/uL).    Immature Granulocytes Absolute, Au* 10/15/2023 0.03  0.00 - 0.70 x10*3/uL Final    Lymphocytes Absolute 10/15/2023 2.74  1.20 - 4.80 x10*3/uL Final    Monocytes Absolute 10/15/2023 0.44  0.10 - 1.00 x10*3/uL Final    Eosinophils Absolute 10/15/2023 0.21  0.00 - 0.70 x10*3/uL Final    Basophils Absolute 10/15/2023 0.07  0.00 - 0.10 x10*3/uL Final    Magnesium 10/15/2023 1.86  1.60 - 2.40 mg/dL Final    Glucose 10/15/2023 99  74 - 99 mg/dL Final    Sodium 10/15/2023 139  136 - 145 mmol/L Final    Potassium 10/15/2023 3.4 (L)  3.5 - 5.3 mmol/L Final    Chloride 10/15/2023 102  98 - 107 mmol/L Final    Bicarbonate 10/15/2023 25  21 - 32 mmol/L Final    Anion Gap 10/15/2023 15  10 - 20 mmol/L Final    Urea Nitrogen 10/15/2023 9  6 - 23 mg/dL Final    Creatinine 10/15/2023 0.75  0.50 - 1.05 mg/dL Final    eGFR 10/15/2023 >90  >60 mL/min/1.73m*2 Final    Calculations of estimated GFR are performed using the 2021 CKD-EPI Study Refit equation without the race variable for the IDMS-Traceable creatinine methods.  https://jasn.asnjournals.org/content/early/2021/09/22/ASN.8852209291    Calcium 10/15/2023 9.9  8.6 - 10.3 mg/dL Final    Albumin 10/15/2023 4.8  3.4 - 5.0 g/dL Final    Alkaline Phosphatase 10/15/2023 118 (H)  33 - 110 U/L Final    Total Protein 10/15/2023 7.7  6.4 - 8.2 g/dL Final    AST 10/15/2023 50 (H)  9 - 39 U/L Final    Bilirubin, Total 10/15/2023 0.4  0.0 - 1.2 mg/dL Final    ALT 10/15/2023 46 (H)  7 - 45 U/L Final    Patients treated with Sulfasalazine may generate falsely decreased results for ALT.    Lipase 10/15/2023 47  9 - 82  U/L Final    D-Dimer Non VTE, Quant (ng/mL FEU) 10/15/2023 353  <=500 ng/mL FEU Final    Protime 10/15/2023 10.2  9.8 - 12.8 seconds Final    INR 10/15/2023 0.9  0.9 - 1.1 Final    aPTT 10/15/2023 26 (L)  27 - 38 seconds Final    Troponin I, High Sensitivity 10/15/2023 <3  0 - 13 ng/L Final    HCG, Beta-Quantitative 10/15/2023 <2  <5 mIU/mL Final    Troponin I, High Sensitivity 10/15/2023 <3  0 - 13 ng/L Final   Orders Only on 08/18/2023   Component Date Value Ref Range Status    WBC 08/18/2023 11.0  4.4 - 11.3 x10E9/L Final    nRBC 08/18/2023 0.0  0.0 - 0.0 /100 WBC Final    RBC 08/18/2023 4.08  4.00 - 5.20 x10E12/L Final    Hemoglobin 08/18/2023 10.6 (L)  12.0 - 16.0 g/dL Final    Hematocrit 08/18/2023 34.5 (L)  36.0 - 46.0 % Final    MCV 08/18/2023 85  80 - 100 fL Final    MCHC 08/18/2023 30.7 (L)  32.0 - 36.0 g/dL Final    Platelets 08/18/2023 247  150 - 450 x10E9/L Final    RDW 08/18/2023 15.3 (H)  11.5 - 14.5 % Final    Glucose 08/18/2023 79  74 - 99 mg/dL Final    Sodium 08/18/2023 140  136 - 145 mmol/L Final    Potassium 08/18/2023 4.3  3.5 - 5.3 mmol/L Final    Chloride 08/18/2023 106  98 - 107 mmol/L Final    Bicarbonate 08/18/2023 21  21 - 32 mmol/L Final    Anion Gap 08/18/2023 17  10 - 20 mmol/L Final    Urea Nitrogen 08/18/2023 7  6 - 23 mg/dL Final    Creatinine 08/18/2023 0.50  0.50 - 1.05 mg/dL Final    GFR Female 08/18/2023 >90  >90 mL/min/1.73m2 Final    Comment:  CALCULATIONS OF ESTIMATED GFR ARE PERFORMED   USING THE 2021 CKD-EPI STUDY REFIT EQUATION   WITHOUT THE RACE VARIABLE FOR THE IDMS-TRACEABLE   CREATININE METHODS.    https://jasn.asnjournals.org/content/early/2021/09/22/ASN.0452658366      Calcium 08/18/2023 9.0  8.6 - 10.6 mg/dL Final    Syphilis Total Ab 08/18/2023 NONREACTIVE  NONREACTIVE Final    Comment: No serologic evidence of syphilis infection.  If recent exposure is suspected, repeat syphilis testing  is recommended in 2 to 4 weeks.      Free T4 08/18/2023 0.97  0.78 - 1.48  ng/dL Final    Comment:  Thyroxine Free testing is performed using different testing    methodology at [facility] than at other    [facility]. Direct result comparisons should    only be made within the same method.      TSH 08/18/2023 3.22  0.44 - 3.98 mIU/L Final    Comment:  TSH testing is performed using different testing    methodology at [facility] than at other    [facility]. Direct result comparisons should    only be made within the same method.      ABO GROUP (TYPE) IN BLOOD 08/18/2023 O   Final    Rh 08/18/2023 POS   Final    ANTIBODY SCREEN 08/18/2023 NEG   Final        Assessment/Plan     Problem List Items Addressed This Visit       Anxiety and depression    Dysautonomia (CMS/HCC)    Mild persistent asthma without complication    Paresis of lower extremity (CMS/HCC)     Other Visit Diagnoses       Routine general medical examination at a health care facility    -  Primary    Need for vaccination        Relevant Orders    Flu vaccine (IIV4) age 6 months and greater, preservative free    Pfizer COVID-19 vaccine, [id number], monovalent, age 12 years and older (30 mcg/0.3 mL)    Anxiety        Relevant Medications    venlafaxine XR (Effexor-XR) 75 mg 24 hr capsule          Up dose effexor-some anxiety  Went to er-likely gerd  Gi cocktail helped    Reqs methylphenidate  Overuse and abuse potential discussed with patient (parents if applicable)  If mood deterioration, status change etc on medicine, suicidal or homicidal ideations -patient agrees to proceed with crisis plan-in place-including-not limited to contacting family, our office and or proceeding to ER.    It is recommended that [place]AR[place] reports be checked and patient have appointment at least every 3months(6 for certain medicines only)  If the agreement signed (controlled substance agreement) is violated prescriptions may be stopped abruptly and patient /family understands and agrees to this.  Another reason for  termination of agreement is if we have concern for abuse or overuse and it is also recommended that patient take responsibility to try to taper and minimize use of these medicines frequently trying to limit or gradually taper to discontinuation.    Patient is aware that side effects such as insomnia, unexpected weight changes are unexpected and should result in discontinuation.  Always use caution and AVOID operating machinery(driving etc) on pain medicines or CNS depressants and avoid combining together OR with alcohol. If opioids are prescribed patient understands the benefits of narcan and was offered prescription.  Follow up sooner if condition deteriorates or problems arise.    Agrees to regular follow and counseling for maximum benefits.    3mo csm

## 2023-11-09 ENCOUNTER — HOSPITAL ENCOUNTER (OUTPATIENT)
Dept: CARDIOLOGY | Facility: HOSPITAL | Age: 28
Discharge: HOME | End: 2023-11-09
Payer: COMMERCIAL

## 2023-11-09 PROCEDURE — 93005 ELECTROCARDIOGRAM TRACING: CPT

## 2023-11-15 DIAGNOSIS — F90.0 ATTENTION DEFICIT HYPERACTIVITY DISORDER (ADHD), PREDOMINANTLY INATTENTIVE TYPE: Primary | ICD-10-CM

## 2023-11-15 NOTE — TELEPHONE ENCOUNTER
Patient called and would like refill of Methylphenidate - Pharmacy [facility].

## 2023-12-01 DIAGNOSIS — F90.0 ATTENTION DEFICIT HYPERACTIVITY DISORDER (ADHD), PREDOMINANTLY INATTENTIVE TYPE: Primary | ICD-10-CM

## 2023-12-01 RX ORDER — METHYLPHENIDATE HYDROCHLORIDE 36 MG/1
36 TABLET ORAL EVERY MORNING
Qty: 30 TABLET | Refills: 0 | Status: SHIPPED | OUTPATIENT
Start: 2023-12-01 | End: 2024-01-10 | Stop reason: SDUPTHER

## 2023-12-05 ENCOUNTER — APPOINTMENT (OUTPATIENT)
Dept: CARDIOLOGY | Facility: CLINIC | Age: 28
End: 2023-12-05
Payer: COMMERCIAL

## 2023-12-06 ENCOUNTER — APPOINTMENT (OUTPATIENT)
Dept: CARDIOLOGY | Facility: CLINIC | Age: 28
End: 2023-12-06
Payer: COMMERCIAL

## 2023-12-11 ENCOUNTER — E-VISIT (OUTPATIENT)
Dept: PRIMARY CARE | Facility: CLINIC | Age: 28
End: 2023-12-11
Payer: COMMERCIAL

## 2023-12-11 DIAGNOSIS — N92.6 MISSED MENSES: Primary | ICD-10-CM

## 2023-12-11 DIAGNOSIS — Z30.011 ENCOUNTER FOR INITIAL PRESCRIPTION OF CONTRACEPTIVE PILLS: ICD-10-CM

## 2023-12-11 PROCEDURE — 99421 OL DIG E/M SVC 5-10 MIN: CPT | Performed by: FAMILY MEDICINE

## 2023-12-11 NOTE — TELEPHONE ENCOUNTER
Evisit since test ordered  Will need follow up if no menses in next 8wks  Lets check labs  The patient is aware that results will be forthcoming of ALL planned labs and or tests. If no results are received on my chart or by letter within 1 - 3 weeks, the patient is aware they need to call to obtain results, as this is not usual. Also, if any new conditions arise, or current condition worsens, it is understood that sooner appointment should be made or urgent care/convenient care or emergency room treatment should be sought depending on severity. Otherwise follow up for recheck at regular intervals as we have discussed, at least yearly.

## 2023-12-13 ENCOUNTER — LAB (OUTPATIENT)
Dept: LAB | Facility: LAB | Age: 28
End: 2023-12-13
Payer: COMMERCIAL

## 2023-12-13 DIAGNOSIS — Z30.011 ENCOUNTER FOR INITIAL PRESCRIPTION OF CONTRACEPTIVE PILLS: ICD-10-CM

## 2023-12-13 DIAGNOSIS — N92.6 MISSED MENSES: ICD-10-CM

## 2023-12-13 LAB
B-HCG SERPL-ACNC: <2 MIU/ML
BASOPHILS # BLD AUTO: 0.07 X10*3/UL (ref 0–0.1)
BASOPHILS NFR BLD AUTO: 1.1 %
EOSINOPHIL # BLD AUTO: 0.13 X10*3/UL (ref 0–0.7)
EOSINOPHIL NFR BLD AUTO: 2.1 %
ERYTHROCYTE [DISTWIDTH] IN BLOOD BY AUTOMATED COUNT: 13.7 % (ref 11.5–14.5)
HCT VFR BLD AUTO: 43.8 % (ref 36–46)
HGB BLD-MCNC: 14.2 G/DL (ref 12–16)
IMM GRANULOCYTES # BLD AUTO: 0.01 X10*3/UL (ref 0–0.7)
IMM GRANULOCYTES NFR BLD AUTO: 0.2 % (ref 0–0.9)
LYMPHOCYTES # BLD AUTO: 1.94 X10*3/UL (ref 1.2–4.8)
LYMPHOCYTES NFR BLD AUTO: 30.8 %
MCH RBC QN AUTO: 28.4 PG (ref 26–34)
MCHC RBC AUTO-ENTMCNC: 32.4 G/DL (ref 32–36)
MCV RBC AUTO: 88 FL (ref 80–100)
MONOCYTES # BLD AUTO: 0.36 X10*3/UL (ref 0.1–1)
MONOCYTES NFR BLD AUTO: 5.7 %
NEUTROPHILS # BLD AUTO: 3.79 X10*3/UL (ref 1.2–7.7)
NEUTROPHILS NFR BLD AUTO: 60.1 %
NRBC BLD-RTO: 0 /100 WBCS (ref 0–0)
PLATELET # BLD AUTO: 338 X10*3/UL (ref 150–450)
RBC # BLD AUTO: 5 X10*6/UL (ref 4–5.2)
T4 FREE SERPL-MCNC: 0.92 NG/DL (ref 0.61–1.12)
TSH SERPL-ACNC: 1.96 MIU/L (ref 0.44–3.98)
WBC # BLD AUTO: 6.3 X10*3/UL (ref 4.4–11.3)

## 2023-12-13 PROCEDURE — 84439 ASSAY OF FREE THYROXINE: CPT

## 2023-12-13 PROCEDURE — 36415 COLL VENOUS BLD VENIPUNCTURE: CPT

## 2023-12-13 PROCEDURE — 84443 ASSAY THYROID STIM HORMONE: CPT

## 2023-12-13 PROCEDURE — 84702 CHORIONIC GONADOTROPIN TEST: CPT

## 2023-12-13 PROCEDURE — 85025 COMPLETE CBC W/AUTO DIFF WBC: CPT

## 2023-12-26 ENCOUNTER — OFFICE VISIT (OUTPATIENT)
Dept: CARDIOLOGY | Facility: CLINIC | Age: 28
End: 2023-12-26
Payer: COMMERCIAL

## 2023-12-26 VITALS
HEART RATE: 80 BPM | HEIGHT: 67 IN | DIASTOLIC BLOOD PRESSURE: 76 MMHG | SYSTOLIC BLOOD PRESSURE: 124 MMHG | WEIGHT: 190.2 LBS | BODY MASS INDEX: 29.85 KG/M2

## 2023-12-26 DIAGNOSIS — G90.1 DYSAUTONOMIA (MULTI): ICD-10-CM

## 2023-12-26 DIAGNOSIS — U09.9 POST COVID-19 CONDITION, UNSPECIFIED: ICD-10-CM

## 2023-12-26 DIAGNOSIS — I47.11 INAPPROPRIATE SINUS TACHYCARDIA (CMS-HCC): ICD-10-CM

## 2023-12-26 DIAGNOSIS — R00.2 PALPITATIONS: ICD-10-CM

## 2023-12-26 DIAGNOSIS — E87.6 HYPOKALEMIA: ICD-10-CM

## 2023-12-26 PROCEDURE — 1036F TOBACCO NON-USER: CPT | Performed by: INTERNAL MEDICINE

## 2023-12-26 PROCEDURE — 99213 OFFICE O/P EST LOW 20 MIN: CPT | Performed by: INTERNAL MEDICINE

## 2023-12-26 RX ORDER — FLUDROCORTISONE ACETATE 0.1 MG/1
0.1 TABLET ORAL 2 TIMES DAILY
COMMUNITY
End: 2024-01-08 | Stop reason: SDUPTHER

## 2023-12-26 NOTE — PROGRESS NOTES
Patient was most recently seen in June 2023.     Subjective :     Overall doing well, does complain of fatigue, has not had any presyncope or syncope.  Compliant with medications.  Recent laboratory data shows that she has hypokalemia.  This could be related to the fludrocortisone.      History so Far :  1. Inappropriate sinus tachycardia-possibly related to deconditioning, rehabilitation would be appropriate, but I do not have a clinical diagnosis to cover cardiac rehabilitation. If there are abnormalities on her pulmonary function testing, we could probably send her to pulmonary rehab.  2. Preserved LV systolic function normal chamber dimensions ECHO 2/19/22 LV ejection fraction 60%, grossly normal valves, normal chamber dimensions, no pericardial effusion.  Left atrial diameter 3.4 cm LV end-systolic diameter 2.9 cm.  3. Borderline arterial blood pressure  4. Anemia-on iron supplementation.  5. BNP December 2021 ...24.  6. Previous abnormalities of CT of the chest have completely resolved. Chest x-ray also reported to be normal. Patient without any respiratory symptoms of cough or audible wheezing at this time, does use her inhalers as needed.  7. Treadmill stress test today-achieved 80% of age-predicted maximum heart rate, see AEP protocol, 7.6 METS. Her chest discomfort 1-2 out of 10 at peak exercise. Started out with a heart rate of 95, mildly accelerated heart rate response to exercise. Blunted blood pressure response to exercise. Oxygen saturations was monitored throughout, stayed in the high 90s, started at 98% and came down to 97% at peak exercise. In recovery oxygen saturation 98%.  8. Diagnosed with attention deficit, placed on Adderall  9. Extreme intolerance to metoprolol succinate, fatigue and excessive sleepiness, patient had to stop the medication  10. GXT 5/17/2021-10.1 METS, 91% age-predicted maximum heart rate, stopped due to fatigue, interestingly functional capacity has improved compared to  "stress test of October 2020 at which time she had chronotropic blunting and achieved 7.6 METS.       Objective      Wt Readings from Last 3 Encounters:   12/26/23 86.3 kg (190 lb 3.2 oz)   11/06/23 90.7 kg (200 lb)   10/15/23 88.5 kg (195 lb)            Physical Exam:    GENERAL APPEARANCE: in no acute distress.  CHEST: Symmetric and non-tender.  INTEGUMENT: Skin warm and dry  HEENT: No gross abnormalities identified.No pallor or scleral icterus.  NECK: Supple, no JVD, no bruit.   NEURO/PSHCY: Alert and oriented x3; appropriate behavior and responses and responses  LUNGS: Clear to auscultation bilaterally; normal respiratory effort.  HEART: Rate and rhythm regular with no evident murmur; no gallop appreciated.   ABDOMEN: Soft, non tender.  MUSCULOSKELETAL: No gross deformities.  EXTREMITIES: Warm  There is no edema noted.    Meds:  Current Outpatient Medications   Medication Instructions    acebutolol (SECTRAL) 200 mg, oral, 2 times daily    cholecalciferol (VITAMIN D-3) 50 mcg, oral, 2 times daily    FeroSuL 325 mg (65 mg iron) tablet 2 tablets, oral, Daily    fludrocortisone (FLORINEF) 0.1 mg, oral, 2 times daily    fluticasone furoate-vilanteroL (Breo Ellipta) 200-25 mcg/dose inhaler 1 Inhalation, inhalation, Daily RT    levalbuterol (Xopenex) 1.25 mg/3 mL nebulizer solution 3 mL, inhalation, Every 8 hours    levalbuterol (Xopenex) 45 mcg/actuation inhaler 2 puffs, inhalation, Every 4 hours RT    magnesium oxide (Mag-Ox) 400 mg (241.3 mg magnesium) tablet 1 tablet, oral, 2 times daily    methylphenidate ER (CONCERTA) 36 mg, oral, Every morning, Do not crush, chew, or split.    methylphenidate HCl 25 mg, oral, Every morning    norethindrone (MICRONOR) 0.35 mg, oral, Daily    venlafaxine XR (EFFEXOR-XR) 150 mg, oral, 2 times daily          Allergies   Allergen Reactions    Escitalopram Other     \"Mental Status Change    Lactose GI Upset    Metoprolol Other     Sleepy/Fatigue       LABS:    Lab Results   Component " Value Date    WBC 6.3 12/13/2023    HGB 14.2 12/13/2023    HCT 43.8 12/13/2023     12/13/2023    ALT 46 (H) 10/15/2023    AST 50 (H) 10/15/2023     10/15/2023    K 3.4 (L) 10/15/2023     10/15/2023    CREATININE 0.75 10/15/2023    BUN 9 10/15/2023    CO2 25 10/15/2023    TSH 1.96 12/13/2023    INR 0.9 10/15/2023    HGBA1C 5.3 08/17/2020                   Problem List:    Patient Active Problem List    Diagnosis Date Noted    Anemia 09/29/2023    Attention deficit hyperactivity disorder (ADHD), predominantly inattentive type 09/29/2023    Vitamin B12 deficiency 09/29/2023    Dysautonomia (CMS/HCC) 09/29/2023    Dysfunctional autonomic nervous system 09/29/2023    Disorder of brain 09/29/2023    Encephalopathy 09/29/2023    Gastroesophageal reflux disease 09/29/2023    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 09/29/2023    Hypotension 09/29/2023    Impaired cognition 09/29/2023    Abnormal gait 09/29/2023    Inappropriate sinus tachycardia 09/29/2023    Insomnia due to medical condition 09/29/2023    Macrosomia 09/29/2023    Memory loss of unknown cause 09/29/2023    Numbness and tingling 09/29/2023    Obesity 09/29/2023    Overweight with body mass index (BMI) 25.0-29.9 09/29/2023    Palpitations 09/29/2023    Poor concentration 09/29/2023    Shortness of breath 09/29/2023    Tendinitis of right rotator cuff 09/29/2023    Vitamin D deficiency 09/29/2023    Paresis of lower extremity (CMS/HCC) 09/29/2023    Anxiety and depression 08/23/2023    Post covid-19 condition, unspecified 08/23/2023    Mild persistent asthma without complication 02/17/2021                 Assessment:  1.  Long COVID hauler  2.  Dysautonomia  3.  Hypokalemia  4.  Structurally normal heart  5.  Not orthostatic    Recommendations:  1.  Potassium chloride 20 mEq daily  2.  Magnesium sulfate-rituals that She will function, because it is easier for her to swallow, 300 mg daily  3.  Basic metabolic profile in 2  weeks  4.  Follow-up in 6 months     Follow up : 6 months      Marie Goodman MD FACC

## 2023-12-29 PROBLEM — Z79.899 MEDICATION COURSE CHANGED: Status: ACTIVE | Noted: 2023-12-29

## 2023-12-29 RX ORDER — POTASSIUM CHLORIDE 20 MEQ/1
20 TABLET, EXTENDED RELEASE ORAL DAILY
Qty: 90 TABLET | Refills: 1 | Status: SHIPPED | OUTPATIENT
Start: 2023-12-29 | End: 2024-05-14 | Stop reason: SINTOL

## 2024-01-08 ENCOUNTER — TELEPHONE (OUTPATIENT)
Dept: PRIMARY CARE | Facility: CLINIC | Age: 29
End: 2024-01-08
Payer: COMMERCIAL

## 2024-01-08 DIAGNOSIS — R00.2 PALPITATIONS: ICD-10-CM

## 2024-01-08 DIAGNOSIS — F90.0 ATTENTION DEFICIT HYPERACTIVITY DISORDER (ADHD), PREDOMINANTLY INATTENTIVE TYPE: ICD-10-CM

## 2024-01-08 RX ORDER — FLUDROCORTISONE ACETATE 0.1 MG/1
0.1 TABLET ORAL 2 TIMES DAILY
Qty: 180 TABLET | Refills: 3 | Status: SHIPPED | OUTPATIENT
Start: 2024-01-08 | End: 2024-05-14 | Stop reason: SDUPTHER

## 2024-01-10 ENCOUNTER — APPOINTMENT (OUTPATIENT)
Dept: CARDIOLOGY | Facility: CLINIC | Age: 29
End: 2024-01-10
Payer: COMMERCIAL

## 2024-01-10 RX ORDER — METHYLPHENIDATE HYDROCHLORIDE 36 MG/1
36 TABLET ORAL EVERY MORNING
Qty: 30 TABLET | Refills: 0 | Status: SHIPPED | OUTPATIENT
Start: 2024-01-10 | End: 2024-02-06 | Stop reason: SDUPTHER

## 2024-01-12 ENCOUNTER — HOSPITAL ENCOUNTER (EMERGENCY)
Age: 29
Discharge: HOME | End: 2024-01-12
Payer: COMMERCIAL

## 2024-01-12 VITALS
BODY MASS INDEX: 28.04 KG/M2 | HEIGHT: 68 IN | SYSTOLIC BLOOD PRESSURE: 125 MMHG | RESPIRATION RATE: 16 BRPM | OXYGEN SATURATION: 95 % | TEMPERATURE: 97.9 F | HEART RATE: 69 BPM | WEIGHT: 185 LBS | DIASTOLIC BLOOD PRESSURE: 71 MMHG

## 2024-01-12 DIAGNOSIS — K21.9 GASTROESOPHAGEAL REFLUX DISEASE WITHOUT ESOPHAGITIS: Primary | ICD-10-CM

## 2024-01-12 LAB
EKG ATRIAL RATE: 69 BPM
EKG P AXIS: 35 DEGREES
EKG P-R INTERVAL: 132 MS
EKG Q-T INTERVAL: 388 MS
EKG QRS DURATION: 86 MS
EKG QTC CALCULATION (BAZETT): 415 MS
EKG R AXIS: 26 DEGREES
EKG T AXIS: 41 DEGREES
EKG VENTRICULAR RATE: 69 BPM

## 2024-01-12 PROCEDURE — 6360000002 HC RX W HCPCS: Performed by: PERSONAL EMERGENCY RESPONSE ATTENDANT

## 2024-01-12 PROCEDURE — A4216 STERILE WATER/SALINE, 10 ML: HCPCS | Performed by: PERSONAL EMERGENCY RESPONSE ATTENDANT

## 2024-01-12 PROCEDURE — 2580000003 HC RX 258: Performed by: PERSONAL EMERGENCY RESPONSE ATTENDANT

## 2024-01-12 PROCEDURE — 96374 THER/PROPH/DIAG INJ IV PUSH: CPT

## 2024-01-12 PROCEDURE — 93010 ELECTROCARDIOGRAM REPORT: CPT | Performed by: INTERNAL MEDICINE

## 2024-01-12 PROCEDURE — 96375 TX/PRO/DX INJ NEW DRUG ADDON: CPT

## 2024-01-12 PROCEDURE — 2500000003 HC RX 250 WO HCPCS: Performed by: PERSONAL EMERGENCY RESPONSE ATTENDANT

## 2024-01-12 PROCEDURE — 6370000000 HC RX 637 (ALT 250 FOR IP): Performed by: PERSONAL EMERGENCY RESPONSE ATTENDANT

## 2024-01-12 PROCEDURE — 99284 EMERGENCY DEPT VISIT MOD MDM: CPT

## 2024-01-12 PROCEDURE — 93005 ELECTROCARDIOGRAM TRACING: CPT | Performed by: EMERGENCY MEDICINE

## 2024-01-12 RX ORDER — ONDANSETRON 2 MG/ML
4 INJECTION INTRAMUSCULAR; INTRAVENOUS ONCE
Status: COMPLETED | OUTPATIENT
Start: 2024-01-12 | End: 2024-01-12

## 2024-01-12 RX ORDER — OMEPRAZOLE 20 MG/1
20 CAPSULE, DELAYED RELEASE ORAL DAILY
Qty: 30 CAPSULE | Refills: 0 | Status: SHIPPED | OUTPATIENT
Start: 2024-01-12 | End: 2024-01-12

## 2024-01-12 RX ORDER — OMEPRAZOLE 20 MG/1
20 CAPSULE, DELAYED RELEASE ORAL DAILY
Qty: 30 CAPSULE | Refills: 0 | Status: SHIPPED | OUTPATIENT
Start: 2024-01-12

## 2024-01-12 RX ADMIN — Medication: at 04:17

## 2024-01-12 RX ADMIN — ONDANSETRON 4 MG: 2 INJECTION INTRAMUSCULAR; INTRAVENOUS at 04:18

## 2024-01-12 RX ADMIN — FAMOTIDINE 20 MG: 10 INJECTION, SOLUTION INTRAVENOUS at 04:17

## 2024-01-12 ASSESSMENT — ENCOUNTER SYMPTOMS
ABDOMINAL PAIN: 0
SHORTNESS OF BREATH: 0
VOMITING: 0
SORE THROAT: 0
BLOOD IN STOOL: 0
COUGH: 0
NAUSEA: 1
COLOR CHANGE: 0
DIARRHEA: 0
RHINORRHEA: 0
BACK PAIN: 1

## 2024-01-12 ASSESSMENT — PAIN DESCRIPTION - ORIENTATION: ORIENTATION: MID

## 2024-01-12 ASSESSMENT — PAIN - FUNCTIONAL ASSESSMENT
PAIN_FUNCTIONAL_ASSESSMENT: NONE - DENIES PAIN
PAIN_FUNCTIONAL_ASSESSMENT: 0-10

## 2024-01-12 ASSESSMENT — PAIN DESCRIPTION - ONSET: ONSET: AWAKENED FROM SLEEP

## 2024-01-12 ASSESSMENT — PAIN DESCRIPTION - DESCRIPTORS: DESCRIPTORS: SHARP;STABBING

## 2024-01-12 ASSESSMENT — PAIN SCALES - GENERAL: PAINLEVEL_OUTOF10: 8

## 2024-01-12 ASSESSMENT — PAIN DESCRIPTION - LOCATION: LOCATION: CHEST

## 2024-01-12 NOTE — ED PROVIDER NOTES
[facility] ED  eMERGENCY dEPARTMENT eNCOUnter      Pt Name: Fidelia Daniel  MRN: [id number]  Birthdate 1995  Date of evaluation: 1/12/2024  Provider: JENNI JEONG    My attending is Dr. Honeycutt    HISTORY OF PRESENT ILLNESS    Fidelia Daniel is a 28 y.o. female with PMHx of ADHD, anemia, anxiety depression, asthma, dysautonomia, GERD presents to the emergency department with chest pain.  Patient dates she woke up around 12:30 AM with midsternal chest stabbing sensation radiating to her back.  Feels like her GERD but worse than normal.  Worse with lying flat, improved sitting up.  She states for bed she had oatmeal and around 4 PM had garlic Parmesan wings.  Slight nausea without vomiting.  She denies fevers, respiratory symptoms, shortness of breath, abdominal pain, diarrhea, constipation, urinary symptoms.  No drugs or alcohol. Patient took 4 Tums, Pepcid, 2 ibuprofen without relief.  No recent hospitalizations or surgeries, long travels    HPI    Nursing Notes were reviewed.    REVIEW OF SYSTEMS       Review of Systems   Constitutional:  Negative for appetite change, chills and fever.   HENT:  Negative for congestion, rhinorrhea and sore throat.    Respiratory:  Negative for cough and shortness of breath.    Cardiovascular:  Positive for chest pain.   Gastrointestinal:  Positive for nausea. Negative for abdominal pain, blood in stool, diarrhea and vomiting.   Genitourinary:  Negative for difficulty urinating.   Musculoskeletal:  Positive for back pain. Negative for neck stiffness.   Skin:  Negative for color change and rash.   Neurological:  Negative for dizziness, syncope, weakness, light-headedness, numbness and headaches.   All other systems reviewed and are negative.            PAST MEDICAL HISTORY     Past Medical History:   Diagnosis Date    ADHD     Anemia     Anxiety and depression     Asthma     Dysautonomia (HCC)     GERD (gastroesophageal reflux disease)          SURGICAL      EKG:All EKG's are interpreted by the Emergency Department Physician who either signs or Co-signs this chart in the absence of a cardiologist.    Personal interpretation:    Normal sinus rhythm, rate 69, normal intervals, normal axis, no ST segment changes    RADIOLOGY:   Non-plain film images such as CT, Ultrasound and MRI are read by theradiologist. Plain radiographic images are visualized and preliminarily interpreted by the emergency physician with the below findings:    Interpretation per theRadiologist below, if available at the time of this note:    No orders to display           LABS:  Labs Reviewed - No data to display    All other labs were within normal range or not returned as of this dictation.    EMERGENCY DEPARTMENT COURSE and DIFFERENTIAL DIAGNOSIS/MDM:   Vitals:    Vitals:    01/12/24 0357 01/12/24 0407   BP: 135/75    Pulse:  71   Resp:  18   Temp: 97.9 °F (36.6 °C)    TempSrc: Oral    SpO2: 99%    Weight: 83.9 kg (185 lb)    Height: 1.727 m (5' 8\")          MDM    Patient presents with epigastric and midsternal stabbing pains, similar to her previous GERD    Patient given Zofran, Pepcid, GI cocktail    On reassessment symptoms are much improved.  Aware of low-fat/greasy foods at home and avoid red sauces.  Will be sent home with omeprazole.  She does have an appointment with her family doctor next week.    Standard anticipatory guidance given to patient upon discharge. Have given them a specific time frame in which to follow-up and who to follow-up with. I have also advised them that they should return to the emergency department if they get worse, or not getting better or develop any new or concerning symptoms. Patient demonstrates understanding.        CRITICAL CARE TIME   Total Critical Caretime was 0 minutes, excluding separately reportable procedures.  There was a high probability of clinically significant/life threatening deterioration in the patient's condition which required my urgent

## 2024-02-06 ENCOUNTER — OFFICE VISIT (OUTPATIENT)
Dept: PRIMARY CARE | Facility: CLINIC | Age: 29
End: 2024-02-06
Payer: COMMERCIAL

## 2024-02-06 VITALS
DIASTOLIC BLOOD PRESSURE: 68 MMHG | WEIGHT: 192 LBS | SYSTOLIC BLOOD PRESSURE: 116 MMHG | HEART RATE: 97 BPM | RESPIRATION RATE: 16 BRPM | BODY MASS INDEX: 30.13 KG/M2 | HEIGHT: 67 IN | OXYGEN SATURATION: 97 % | TEMPERATURE: 97.4 F

## 2024-02-06 DIAGNOSIS — G47.01 INSOMNIA DUE TO MEDICAL CONDITION: ICD-10-CM

## 2024-02-06 DIAGNOSIS — F32.A ANXIETY AND DEPRESSION: ICD-10-CM

## 2024-02-06 DIAGNOSIS — G90.1 DYSAUTONOMIA (MULTI): ICD-10-CM

## 2024-02-06 DIAGNOSIS — J45.30 MILD PERSISTENT ASTHMA WITHOUT COMPLICATION (HHS-HCC): ICD-10-CM

## 2024-02-06 DIAGNOSIS — F90.0 ATTENTION DEFICIT HYPERACTIVITY DISORDER (ADHD), PREDOMINANTLY INATTENTIVE TYPE: ICD-10-CM

## 2024-02-06 DIAGNOSIS — N91.2 AMENORRHEA: Primary | ICD-10-CM

## 2024-02-06 DIAGNOSIS — F41.9 ANXIETY AND DEPRESSION: ICD-10-CM

## 2024-02-06 DIAGNOSIS — G83.10 PARESIS OF LOWER EXTREMITY (MULTI): ICD-10-CM

## 2024-02-06 DIAGNOSIS — K21.9 GASTROESOPHAGEAL REFLUX DISEASE, UNSPECIFIED WHETHER ESOPHAGITIS PRESENT: ICD-10-CM

## 2024-02-06 PROCEDURE — 1036F TOBACCO NON-USER: CPT | Performed by: FAMILY MEDICINE

## 2024-02-06 PROCEDURE — 99214 OFFICE O/P EST MOD 30 MIN: CPT | Performed by: FAMILY MEDICINE

## 2024-02-06 RX ORDER — OMEPRAZOLE 20 MG/1
1 CAPSULE, DELAYED RELEASE ORAL DAILY
COMMUNITY
Start: 2024-01-12 | End: 2024-02-06 | Stop reason: SDUPTHER

## 2024-02-06 RX ORDER — OMEPRAZOLE 20 MG/1
20 CAPSULE, DELAYED RELEASE ORAL DAILY
Qty: 90 CAPSULE | Refills: 1 | Status: SHIPPED | OUTPATIENT
Start: 2024-02-06 | End: 2024-05-14 | Stop reason: SDUPTHER

## 2024-02-06 RX ORDER — METHYLPHENIDATE HYDROCHLORIDE 36 MG/1
36 TABLET ORAL EVERY MORNING
Qty: 30 TABLET | Refills: 0 | Status: SHIPPED | OUTPATIENT
Start: 2024-02-06 | End: 2024-05-14 | Stop reason: SDUPTHER

## 2024-02-06 NOTE — PROGRESS NOTES
Subjective   Patient ID: Sienna Cabrera is a 28 y.o. female who presents for Med Management and Amenorrhea (Stopped ocp 3 weeks ago because she was not getting a period-still has not had one).  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    Hypotension    Impaired cognition    Abnormal gait    Inappropriate sinus tachycardia    Insomnia due to medical condition    Macrosomia    Memory loss of unknown cause    Mild persistent asthma without complication    Numbness and tingling    Obesity    Overweight with body mass index (BMI) 25.0-29.9    Palpitations    Poor concentration    Post covid-19 condition, unspecified    Shortness of breath    Tendinitis of right rotator cuff    Vitamin D deficiency    Paresis of lower extremity (CMS/HCC)    Hypokalemia    Medication course changed       Past Surgical History:   Procedure Laterality Date    MR HEAD ANGIO WO IV CONTRAST  12/08/2021    MR HEAD ANGIO WO IV CONTRAST 12/8/2021 [facility]    MR NECK ANGIO WO IV CONTRAST  12/08/2021    MR NECK ANGIO WO IV CONTRAST 12/8/2021 [facility]    TONSILLECTOMY      with adenoidectomy       Review of Systems  This patient has  NO history of seizures/ CAD or CVA    NO history of recent Covid nor flu symptoms,  NO Fever nor chills,  NO Chest pain, shortness of breath nor paroxysmal nocturnal dyspnea,  NO Nausea, vomiting, nor diarrhea,  NO Hematochezia nor melena,  NO Dysuria, hematuria, nor new incontinence issues  NO new severe headaches nor neurological complaints,  NO new issues with anxiety nor depression nor new  "psychiatric complaints,  NO suicidal nor homicidal ideations.     OBJECTIVE:  /68   Pulse 97   Temp 36.3 °C (97.4 °F) (Temporal)   Resp 16   Ht 1.702 m (5' 7\")   Wt 87.1 kg (192 lb)   LMP 12/06/2023   SpO2 97%   BMI 30.07 kg/m²      General:  alert, oriented, no acute distress.  No obvious skin rashes noted.   No gait disturbance noted.    Mood is pleasant,  no signs of emotional distress.   Not appearing intoxicated or altered.   No voiced delusions,   Normal, appropriate behavior.    HEENT: Normocephalic, atraumatic,   Pupils round, reactive to light  Extraocular motions intact and wnl  Tympanic membranes normal    Neck: no nuchal rigidity  No masses palpable.  No carotid bruits.  No thyromegaly.    Respiratory: Equal breath sounds  No wheezes,    rales,    nor rhonchi  No respiratory distress.    Heart: Regular rate and rhythm, no    murmurs  no rubs/gallops    Abdomen: no masses palpable, nontender, no rebound nor guarding.    Extremities: NO cyanosis noted, no clubbing.   No edema noted.  2+dorsalis pedis pulses.    Normal-not antalgic, steady gait.    Lab on 12/13/2023   Component Date Value Ref Range Status    WBC 12/13/2023 6.3  4.4 - 11.3 x10*3/uL Final    nRBC 12/13/2023 0.0  0.0 - 0.0 /100 WBCs Final    RBC 12/13/2023 5.00  4.00 - 5.20 x10*6/uL Final    Hemoglobin 12/13/2023 14.2  12.0 - 16.0 g/dL Final    Hematocrit 12/13/2023 43.8  36.0 - 46.0 % Final    MCV 12/13/2023 88  80 - 100 fL Final    MCH 12/13/2023 28.4  26.0 - 34.0 pg Final    MCHC 12/13/2023 32.4  32.0 - 36.0 g/dL Final    RDW 12/13/2023 13.7  11.5 - 14.5 % Final    Platelets 12/13/2023 338  150 - 450 x10*3/uL Final    Neutrophils % 12/13/2023 60.1  40.0 - 80.0 % Final    Immature Granulocytes %, Automated 12/13/2023 0.2  0.0 - 0.9 % Final    Immature Granulocyte Count (IG) includes promyelocytes, myelocytes and metamyelocytes but does not include bands. Percent differential counts (%) should be interpreted in the context of " the absolute cell counts (cells/UL).    Lymphocytes % 12/13/2023 30.8  13.0 - 44.0 % Final    Monocytes % 12/13/2023 5.7  2.0 - 10.0 % Final    Eosinophils % 12/13/2023 2.1  0.0 - 6.0 % Final    Basophils % 12/13/2023 1.1  0.0 - 2.0 % Final    Neutrophils Absolute 12/13/2023 3.79  1.20 - 7.70 x10*3/uL Final    Percent differential counts (%) should be interpreted in the context of the absolute cell counts (cells/uL).    Immature Granulocytes Absolute, Au* 12/13/2023 0.01  0.00 - 0.70 x10*3/uL Final    Lymphocytes Absolute 12/13/2023 1.94  1.20 - 4.80 x10*3/uL Final    Monocytes Absolute 12/13/2023 0.36  0.10 - 1.00 x10*3/uL Final    Eosinophils Absolute 12/13/2023 0.13  0.00 - 0.70 x10*3/uL Final    Basophils Absolute 12/13/2023 0.07  0.00 - 0.10 x10*3/uL Final    Thyroxine, Free 12/13/2023 0.92  0.61 - 1.12 ng/dL Final    Thyroid Stimulating Hormone 12/13/2023 1.96  0.44 - 3.98 mIU/L Final    HCG, Beta-Quantitative 12/13/2023 <2  <5 mIU/mL Final        Assessment/Plan     Problem List Items Addressed This Visit       Anxiety and depression    Attention deficit hyperactivity disorder (ADHD), predominantly inattentive type    Relevant Medications    methylphenidate ER (Concerta) 36 mg daily tablet    Dysautonomia (CMS/HCC)    Gastroesophageal reflux disease    Relevant Medications    omeprazole (PriLOSEC) 20 mg DR capsule    Insomnia due to medical condition    Mild persistent asthma without complication    Paresis of lower extremity (CMS/HCC)       Follow up at next scheduled visit 3mo csm  Overuse and abuse potential discussed with patient (parents if applicable)  If mood deterioration, status change etc on medicine, suicidal or homicidal ideations -patient agrees to proceed with crisis plan-in place-including-not limited to contacting family, our office and or proceeding to ER.    It is recommended that [place] reports be checked and patient have appointment at least every 3months(6 for certain medicines only)  If  the agreement signed (controlled substance agreement) is violated prescriptions may be stopped abruptly and patient /family understands and agrees to this.  Another reason for termination of agreement is if we have concern for abuse or overuse and it is also recommended that patient take responsibility to try to taper and minimize use of these medicines frequently trying to limit or gradually taper to discontinuation.    Patient is aware that side effects such as insomnia, unexpected weight changes are unexpected and should result in discontinuation.  Always use caution and AVOID operating machinery(driving etc) on pain medicines or CNS depressants and avoid combining together OR with alcohol. If opioids are prescribed patient understands the benefits of narcan and was offered prescription.  Follow up sooner if condition deteriorates or problems arise.    Agrees to regular follow and counseling for maximum benefits.  On effexor 2 every day  See cardio q 6mo  3mo csm  No menses in 3wks  Check pelvic ultrasound  If thick lining will do provera 10d

## 2024-02-12 ENCOUNTER — APPOINTMENT (OUTPATIENT)
Dept: RADIOLOGY | Facility: CLINIC | Age: 29
End: 2024-02-12
Payer: COMMERCIAL

## 2024-03-20 ENCOUNTER — TELEPHONE (OUTPATIENT)
Dept: PRIMARY CARE | Facility: CLINIC | Age: 29
End: 2024-03-20
Payer: COMMERCIAL

## 2024-03-20 DIAGNOSIS — F32.A ANXIETY AND DEPRESSION: ICD-10-CM

## 2024-03-20 DIAGNOSIS — F41.9 ANXIETY AND DEPRESSION: ICD-10-CM

## 2024-03-20 NOTE — TELEPHONE ENCOUNTER
Dr Freeman pt calling in for a referral to psychiatry for her anxiety and depression meds, pt is needing a 2nd opinion. Pt can be reached at [phone]

## 2024-04-05 ENCOUNTER — APPOINTMENT (OUTPATIENT)
Dept: OBSTETRICS AND GYNECOLOGY | Facility: CLINIC | Age: 29
End: 2024-04-05
Payer: COMMERCIAL

## 2024-04-17 DIAGNOSIS — I47.11 INAPPROPRIATE SINUS TACHYCARDIA (CMS-HCC): ICD-10-CM

## 2024-04-18 RX ORDER — ACEBUTOLOL HYDROCHLORIDE 200 MG/1
200 CAPSULE ORAL 2 TIMES DAILY
Qty: 180 CAPSULE | Refills: 0 | Status: SHIPPED | OUTPATIENT
Start: 2024-04-18

## 2024-05-14 ENCOUNTER — OFFICE VISIT (OUTPATIENT)
Dept: PRIMARY CARE | Facility: CLINIC | Age: 29
End: 2024-05-14
Payer: COMMERCIAL

## 2024-05-14 ENCOUNTER — LAB (OUTPATIENT)
Dept: LAB | Facility: LAB | Age: 29
End: 2024-05-14
Payer: COMMERCIAL

## 2024-05-14 VITALS
HEART RATE: 80 BPM | RESPIRATION RATE: 16 BRPM | SYSTOLIC BLOOD PRESSURE: 112 MMHG | HEIGHT: 67 IN | BODY MASS INDEX: 31.39 KG/M2 | OXYGEN SATURATION: 98 % | DIASTOLIC BLOOD PRESSURE: 60 MMHG | WEIGHT: 200 LBS | TEMPERATURE: 97.2 F

## 2024-05-14 DIAGNOSIS — E55.9 VITAMIN D DEFICIENCY: ICD-10-CM

## 2024-05-14 DIAGNOSIS — I47.11 INAPPROPRIATE SINUS TACHYCARDIA (CMS-HCC): ICD-10-CM

## 2024-05-14 DIAGNOSIS — F43.10 PTSD (POST-TRAUMATIC STRESS DISORDER): ICD-10-CM

## 2024-05-14 DIAGNOSIS — D64.9 ANEMIA, UNSPECIFIED TYPE: ICD-10-CM

## 2024-05-14 DIAGNOSIS — F90.0 ATTENTION DEFICIT HYPERACTIVITY DISORDER (ADHD), PREDOMINANTLY INATTENTIVE TYPE: ICD-10-CM

## 2024-05-14 DIAGNOSIS — F41.9 ANXIETY: ICD-10-CM

## 2024-05-14 DIAGNOSIS — K21.9 GASTROESOPHAGEAL REFLUX DISEASE, UNSPECIFIED WHETHER ESOPHAGITIS PRESENT: ICD-10-CM

## 2024-05-14 DIAGNOSIS — F43.10 PTSD (POST-TRAUMATIC STRESS DISORDER): Primary | ICD-10-CM

## 2024-05-14 DIAGNOSIS — R00.2 PALPITATIONS: ICD-10-CM

## 2024-05-14 DIAGNOSIS — E53.8 VITAMIN B12 DEFICIENCY: ICD-10-CM

## 2024-05-14 DIAGNOSIS — F32.A ANXIETY AND DEPRESSION: ICD-10-CM

## 2024-05-14 DIAGNOSIS — F41.9 ANXIETY AND DEPRESSION: ICD-10-CM

## 2024-05-14 DIAGNOSIS — G90.1 DYSAUTONOMIA (MULTI): ICD-10-CM

## 2024-05-14 DIAGNOSIS — E87.6 HYPOKALEMIA: ICD-10-CM

## 2024-05-14 DIAGNOSIS — U09.9 POST COVID-19 CONDITION, UNSPECIFIED: ICD-10-CM

## 2024-05-14 LAB
ALBUMIN SERPL BCP-MCNC: 4.5 G/DL (ref 3.4–5)
ALP SERPL-CCNC: 113 U/L (ref 33–110)
ALT SERPL W P-5'-P-CCNC: 16 U/L (ref 7–45)
ANION GAP SERPL CALC-SCNC: 13 MMOL/L (ref 10–20)
AST SERPL W P-5'-P-CCNC: 14 U/L (ref 9–39)
BILIRUB SERPL-MCNC: 0.6 MG/DL (ref 0–1.2)
BUN SERPL-MCNC: 14 MG/DL (ref 6–23)
CALCIUM SERPL-MCNC: 9.3 MG/DL (ref 8.6–10.3)
CHLORIDE SERPL-SCNC: 105 MMOL/L (ref 98–107)
CO2 SERPL-SCNC: 26 MMOL/L (ref 21–32)
CREAT SERPL-MCNC: 0.69 MG/DL (ref 0.5–1.05)
EGFRCR SERPLBLD CKD-EPI 2021: >90 ML/MIN/1.73M*2
EST. AVERAGE GLUCOSE BLD GHB EST-MCNC: 103 MG/DL
FERRITIN SERPL-MCNC: 42 NG/ML (ref 8–150)
GLUCOSE SERPL-MCNC: 87 MG/DL (ref 74–99)
HBA1C MFR BLD: 5.2 %
POTASSIUM SERPL-SCNC: 3.9 MMOL/L (ref 3.5–5.3)
PROT SERPL-MCNC: 7.3 G/DL (ref 6.4–8.2)
SODIUM SERPL-SCNC: 140 MMOL/L (ref 136–145)
T4 FREE SERPL-MCNC: 0.88 NG/DL (ref 0.61–1.12)
TSH SERPL-ACNC: 1.94 MIU/L (ref 0.44–3.98)
VIT B12 SERPL-MCNC: 302 PG/ML (ref 211–911)

## 2024-05-14 PROCEDURE — 83036 HEMOGLOBIN GLYCOSYLATED A1C: CPT

## 2024-05-14 PROCEDURE — 80053 COMPREHEN METABOLIC PANEL: CPT

## 2024-05-14 PROCEDURE — 82652 VIT D 1 25-DIHYDROXY: CPT

## 2024-05-14 PROCEDURE — 99214 OFFICE O/P EST MOD 30 MIN: CPT | Performed by: FAMILY MEDICINE

## 2024-05-14 PROCEDURE — 82607 VITAMIN B-12: CPT

## 2024-05-14 PROCEDURE — 1036F TOBACCO NON-USER: CPT | Performed by: FAMILY MEDICINE

## 2024-05-14 PROCEDURE — 84439 ASSAY OF FREE THYROXINE: CPT

## 2024-05-14 PROCEDURE — 82728 ASSAY OF FERRITIN: CPT

## 2024-05-14 PROCEDURE — 84443 ASSAY THYROID STIM HORMONE: CPT

## 2024-05-14 PROCEDURE — 36415 COLL VENOUS BLD VENIPUNCTURE: CPT

## 2024-05-14 RX ORDER — VENLAFAXINE HYDROCHLORIDE 75 MG/1
75 CAPSULE, EXTENDED RELEASE ORAL 3 TIMES DAILY
Qty: 270 CAPSULE | Refills: 1 | Status: SHIPPED | OUTPATIENT
Start: 2024-05-14

## 2024-05-14 RX ORDER — OMEPRAZOLE 20 MG/1
20 CAPSULE, DELAYED RELEASE ORAL DAILY
Qty: 90 CAPSULE | Refills: 3 | Status: SHIPPED | OUTPATIENT
Start: 2024-05-14

## 2024-05-14 RX ORDER — FERROUS SULFATE 325(65) MG
2 TABLET ORAL DAILY
Qty: 180 TABLET | Refills: 3 | Status: SHIPPED | OUTPATIENT
Start: 2024-05-14

## 2024-05-14 RX ORDER — METHYLPHENIDATE HYDROCHLORIDE 36 MG/1
36 TABLET ORAL EVERY MORNING
Qty: 30 TABLET | Refills: 0 | Status: SHIPPED | OUTPATIENT
Start: 2024-05-14 | End: 2024-06-13

## 2024-05-14 RX ORDER — VENLAFAXINE HYDROCHLORIDE 75 MG/1
150 CAPSULE, EXTENDED RELEASE ORAL DAILY
Qty: 180 CAPSULE | Refills: 3 | Status: SHIPPED | OUTPATIENT
Start: 2024-05-14 | End: 2024-05-14 | Stop reason: SDUPTHER

## 2024-05-14 RX ORDER — FLUDROCORTISONE ACETATE 0.1 MG/1
0.1 TABLET ORAL 2 TIMES DAILY
Qty: 180 TABLET | Refills: 3 | Status: SHIPPED | OUTPATIENT
Start: 2024-05-14 | End: 2025-05-14

## 2024-05-14 NOTE — PROGRESS NOTES
Subjective   Patient ID: Sienna Cabrera is a 28 y.o. female who presents for Med Management (Unable to find Concerta at the pharmacies--only takes sparingly) and Medication Problem (Pt is unable to take potassium tablets--too big).  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    Hypotension    Impaired cognition    Abnormal gait    Inappropriate sinus tachycardia (CMS-HCC)    Insomnia due to medical condition    Macrosomia (HHS-HCC)    Memory loss of unknown cause    Mild persistent asthma without complication (HHS-HCC)    Numbness and tingling    Obesity    Overweight with body mass index (BMI) 25.0-29.9    Palpitations    Poor concentration    Post covid-19 condition, unspecified    Shortness of breath    Tendinitis of right rotator cuff    Vitamin D deficiency    Paresis of lower extremity (Multi)    Hypokalemia    Medication course changed       Past Surgical History:   Procedure Laterality Date    MR HEAD ANGIO WO IV CONTRAST  12/08/2021    MR HEAD ANGIO WO IV CONTRAST 12/8/2021 [facility]    MR NECK ANGIO WO IV CONTRAST  12/08/2021    MR NECK ANGIO WO IV CONTRAST 12/8/2021 [facility]    TONSILLECTOMY      with adenoidectomy       Review of Systems  This patient has  NO history of seizures/ CAD or CVA    NO history of recent Covid nor flu symptoms,  NO Fever nor chills,  NO Chest pain, shortness of breath nor paroxysmal nocturnal dyspnea,  NO Nausea, vomiting, nor diarrhea,  NO Hematochezia nor melena,  NO Dysuria, hematuria, nor new incontinence issues  NO new severe headaches nor  "neurological complaints,  NO new issues with anxiety nor depression nor new psychiatric complaints,  NO suicidal nor homicidal ideations.     OBJECTIVE:  /60   Pulse 80   Temp 36.2 °C (97.2 °F) (Temporal)   Resp 16   Ht 1.702 m (5' 7\")   Wt 90.7 kg (200 lb)   LMP 05/07/2024 (Exact Date)   SpO2 98%   BMI 31.32 kg/m²      General:  alert, oriented, no acute distress.  No obvious skin rashes noted.   No gait disturbance noted.    Mood is pleasant,  no signs of emotional distress.   Not appearing intoxicated or altered.   No voiced delusions,   Normal, appropriate behavior.    HEENT: Normocephalic, atraumatic,   Pupils round, reactive to light  Extraocular motions intact and wnl  Tympanic membranes normal    Neck: no nuchal rigidity  No masses palpable.  No carotid bruits.  No thyromegaly.    Respiratory: Equal breath sounds  No wheezes,    rales,    nor rhonchi  No respiratory distress.    Heart: Regular rate and rhythm, no    murmurs  no rubs/gallops    Abdomen: no masses palpable, nontender, no rebound nor guarding.  ovwt  Extremities: NO cyanosis noted, no clubbing.   No edema noted.  2+dorsalis pedis pulses.    Normal-not antalgic, steady gait.    No visits with results within 3 Month(s) from this visit.   Latest known visit with results is:   Lab on 12/13/2023   Component Date Value Ref Range Status    WBC 12/13/2023 6.3  4.4 - 11.3 x10*3/uL Final    nRBC 12/13/2023 0.0  0.0 - 0.0 /100 WBCs Final    RBC 12/13/2023 5.00  4.00 - 5.20 x10*6/uL Final    Hemoglobin 12/13/2023 14.2  12.0 - 16.0 g/dL Final    Hematocrit 12/13/2023 43.8  36.0 - 46.0 % Final    MCV 12/13/2023 88  80 - 100 fL Final    MCH 12/13/2023 28.4  26.0 - 34.0 pg Final    MCHC 12/13/2023 32.4  32.0 - 36.0 g/dL Final    RDW 12/13/2023 13.7  11.5 - 14.5 % Final    Platelets 12/13/2023 338  150 - 450 x10*3/uL Final    Neutrophils % 12/13/2023 60.1  40.0 - 80.0 % Final    Immature Granulocytes %, Automated 12/13/2023 0.2  0.0 - 0.9 % Final "    Immature Granulocyte Count (IG) includes promyelocytes, myelocytes and metamyelocytes but does not include bands. Percent differential counts (%) should be interpreted in the context of the absolute cell counts (cells/UL).    Lymphocytes % 12/13/2023 30.8  13.0 - 44.0 % Final    Monocytes % 12/13/2023 5.7  2.0 - 10.0 % Final    Eosinophils % 12/13/2023 2.1  0.0 - 6.0 % Final    Basophils % 12/13/2023 1.1  0.0 - 2.0 % Final    Neutrophils Absolute 12/13/2023 3.79  1.20 - 7.70 x10*3/uL Final    Percent differential counts (%) should be interpreted in the context of the absolute cell counts (cells/uL).    Immature Granulocytes Absolute, Au* 12/13/2023 0.01  0.00 - 0.70 x10*3/uL Final    Lymphocytes Absolute 12/13/2023 1.94  1.20 - 4.80 x10*3/uL Final    Monocytes Absolute 12/13/2023 0.36  0.10 - 1.00 x10*3/uL Final    Eosinophils Absolute 12/13/2023 0.13  0.00 - 0.70 x10*3/uL Final    Basophils Absolute 12/13/2023 0.07  0.00 - 0.10 x10*3/uL Final    Thyroxine, Free 12/13/2023 0.92  0.61 - 1.12 ng/dL Final    Thyroid Stimulating Hormone 12/13/2023 1.96  0.44 - 3.98 mIU/L Final    HCG, Beta-Quantitative 12/13/2023 <2  <5 mIU/mL Final        Assessment/Plan     Problem List Items Addressed This Visit       Anemia    Relevant Medications    ferrous sulfate, 325 mg ferrous sulfate, (FeroSuL) tablet    iron polysaccharide-B12-folic acid (Niferex 150 Forte) 150-25-1 mg-mcg-mg capsule    Other Relevant Orders    Comprehensive Metabolic Panel    Hemoglobin A1C    Vitamin B12    Vitamin D 1,25 Dihydroxy (for eval of hypercalcemia)    Ferritin    Anxiety and depression    Relevant Medications    iron polysaccharide-B12-folic acid (Niferex 150 Forte) 150-25-1 mg-mcg-mg capsule    venlafaxine XR (Effexor-XR) 75 mg 24 hr capsule    Other Relevant Orders    Comprehensive Metabolic Panel    Hemoglobin A1C    Vitamin B12    Vitamin D 1,25 Dihydroxy (for eval of hypercalcemia)    Ferritin    Attention deficit hyperactivity disorder  (ADHD), predominantly inattentive type    Relevant Medications    iron polysaccharide-B12-folic acid (Niferex 150 Forte) 150-25-1 mg-mcg-mg capsule    methylphenidate ER 36 mg extended release tablet    Other Relevant Orders    Comprehensive Metabolic Panel    Hemoglobin A1C    Vitamin B12    Vitamin D 1,25 Dihydroxy (for eval of hypercalcemia)    Ferritin    Vitamin B12 deficiency    Relevant Medications    iron polysaccharide-B12-folic acid (Niferex 150 Forte) 150-25-1 mg-mcg-mg capsule    Other Relevant Orders    Comprehensive Metabolic Panel    Hemoglobin A1C    Vitamin B12    Vitamin D 1,25 Dihydroxy (for eval of hypercalcemia)    Ferritin    Gastroesophageal reflux disease    Relevant Medications    omeprazole (PriLOSEC) 20 mg DR capsule    iron polysaccharide-B12-folic acid (Niferex 150 Forte) 150-25-1 mg-mcg-mg capsule    Other Relevant Orders    Comprehensive Metabolic Panel    Hemoglobin A1C    Vitamin B12    Vitamin D 1,25 Dihydroxy (for eval of hypercalcemia)    Ferritin    Inappropriate sinus tachycardia (CMS-HCC)    Relevant Medications    iron polysaccharide-B12-folic acid (Niferex 150 Forte) 150-25-1 mg-mcg-mg capsule    Other Relevant Orders    Comprehensive Metabolic Panel    Hemoglobin A1C    Vitamin B12    Vitamin D 1,25 Dihydroxy (for eval of hypercalcemia)    Ferritin    Palpitations    Relevant Medications    fludrocortisone (Florinef) 0.1 mg tablet    iron polysaccharide-B12-folic acid (Niferex 150 Forte) 150-25-1 mg-mcg-mg capsule    Other Relevant Orders    Comprehensive Metabolic Panel    Hemoglobin A1C    Vitamin B12    Vitamin D 1,25 Dihydroxy (for eval of hypercalcemia)    Ferritin    Vitamin D deficiency    Relevant Medications    iron polysaccharide-B12-folic acid (Niferex 150 Forte) 150-25-1 mg-mcg-mg capsule    Other Relevant Orders    Comprehensive Metabolic Panel    Hemoglobin A1C    Vitamin B12    Vitamin D 1,25 Dihydroxy (for eval of hypercalcemia)    Ferritin     Other Visit  Diagnoses       Anxiety        Relevant Medications    iron polysaccharide-B12-folic acid (Niferex 150 Forte) 150-25-1 mg-mcg-mg capsule    Other Relevant Orders    Comprehensive Metabolic Panel    Hemoglobin A1C    Vitamin B12    Vitamin D 1,25 Dihydroxy (for eval of hypercalcemia)    Ferritin            Follow up at next scheduled visit   labs due  The patient is aware that results will be forthcoming of ALL planned labs and or tests. If no results are received on my chart or by letter within 1 - 3 weeks, the patient is aware they need to call to obtain results, as this is not usual. Also, if any new conditions arise, or current condition worsens, it is understood that sooner appointment should be made or urgent care/convenient care or emergency room treatment should be sought depending on severity. Otherwise follow up for recheck at regular intervals as we have discussed, at least yearly.  6mo  Sooner if new or unresolved issues    Needs counseling too-declines  Occ overwhelmed  No hi/si  Will up effexor to tid    Could benefit from opinion of psychiatry for ptsd-some mild dissociation-zoning out  Overuse and abuse potential discussed with patient (parents if applicable)  If mood deterioration, status change etc on medicine, suicidal or homicidal ideations -patient agrees to proceed with crisis plan-in place-including-not limited to contacting family, our office and or proceeding to ER.  Reviewed controlled substance agreement - including but not limited to the risks-benefits-alternatives to treatment with a controlled substance medication(s).  It is recommended that [place]AR[place] reports be checked and patient have appointment at least every 3months(6 for certain medicines only)  If the agreement signed (controlled substance agreement) is violated prescriptions may be stopped abruptly and patient /family understands and agrees to this.  Another reason for termination of agreement is if we have concern for abuse or  overuse and it is also recommended that patient take responsibility to try to taper and minimize use of these medicines frequently trying to limit or gradually taper to discontinuation.    Patient is aware that side effects such as insomnia, unexpected weight changes are unexpected and should result in discontinuation.  Always use caution and AVOID operating machinery(driving etc) on pain medicines or CNS depressants and avoid combining together OR with alcohol. If opioids are prescribed patient understands the benefits of narcan and was offered prescription.  Follow up sooner if condition deteriorates or problems arise.    Agrees to regular follow and counseling for maximum benefits.

## 2024-05-16 LAB — 1,25(OH)2D3 SERPL-MCNC: 73.9 PG/ML (ref 19.9–79.3)

## 2024-05-31 ENCOUNTER — OFFICE VISIT (OUTPATIENT)
Dept: CARDIOLOGY | Facility: HOSPITAL | Age: 29
End: 2024-05-31
Payer: COMMERCIAL

## 2024-05-31 VITALS
BODY MASS INDEX: 30.46 KG/M2 | SYSTOLIC BLOOD PRESSURE: 113 MMHG | OXYGEN SATURATION: 98 % | HEART RATE: 76 BPM | DIASTOLIC BLOOD PRESSURE: 74 MMHG | WEIGHT: 201 LBS | HEIGHT: 68 IN

## 2024-05-31 DIAGNOSIS — R00.2 PALPITATIONS: ICD-10-CM

## 2024-05-31 DIAGNOSIS — R53.83 FATIGUE AFTER COVID-19 VACCINATION: ICD-10-CM

## 2024-05-31 DIAGNOSIS — G44.89 OTHER HEADACHE SYNDROME: ICD-10-CM

## 2024-05-31 DIAGNOSIS — T50.B95A FATIGUE AFTER COVID-19 VACCINATION: ICD-10-CM

## 2024-05-31 DIAGNOSIS — I47.11 INAPPROPRIATE SINUS TACHYCARDIA (CMS-HCC): ICD-10-CM

## 2024-05-31 LAB
ATRIAL RATE: 78 BPM
P AXIS: 19 DEGREES
P OFFSET: 211 MS
P ONSET: 154 MS
PR INTERVAL: 128 MS
Q ONSET: 218 MS
QRS COUNT: 12 BEATS
QRS DURATION: 94 MS
QT INTERVAL: 366 MS
QTC CALCULATION(BAZETT): 417 MS
QTC FREDERICIA: 399 MS
R AXIS: 43 DEGREES
T AXIS: 35 DEGREES
T OFFSET: 401 MS
VENTRICULAR RATE: 78 BPM

## 2024-05-31 PROCEDURE — 99214 OFFICE O/P EST MOD 30 MIN: CPT | Performed by: INTERNAL MEDICINE

## 2024-05-31 PROCEDURE — 93005 ELECTROCARDIOGRAM TRACING: CPT | Performed by: INTERNAL MEDICINE

## 2024-05-31 PROCEDURE — RXMED WILLOW AMBULATORY MEDICATION CHARGE

## 2024-05-31 PROCEDURE — 99204 OFFICE O/P NEW MOD 45 MIN: CPT | Performed by: INTERNAL MEDICINE

## 2024-05-31 NOTE — PROGRESS NOTES
"I had the pleasure seeing Sienna Cbarera     Chief Complaint   Patient presents with    Inappropriate Sinus Tachycardia    Post COVID Condition     OUTPATIENT CONSULTATION: Cardiac Electrophysiology  DOS: May 31, 2024  REASON:  Post COVID condition  REFERRING:  Marie Goodman MD    Pt presents for a 2nd opinion.          Current Outpatient Medications   Medication Instructions    acebutolol (SECTRAL) 200 mg, oral, 2 times daily    cholecalciferol (VITAMIN D-3) 50 mcg, oral, 2 times daily    ferrous sulfate, 325 mg ferrous sulfate, (FeroSuL) tablet 2 tablets, oral, Daily    fludrocortisone (FLORINEF) 0.1 mg, oral, 2 times daily    iron polysaccharide-B12-folic acid (Niferex 150 Forte) 150-25-1 mg-mcg-mg capsule 1 capsule, oral, Daily    levalbuterol (Xopenex) 45 mcg/actuation inhaler 2 puffs, inhalation, Every 4 hours RT    methylphenidate ER 36 mg, oral, Every morning, Do not crush, chew, or split.    omeprazole (PRILOSEC) 20 mg, oral, Daily    venlafaxine XR (EFFEXOR-XR) 75 mg, oral, 3 times daily        Sienna Cabrera is a 28 y.o. with:     Anemia, ADHD, GERD, Obesity, Encephalopathy  Asthma, Post COVID condition  Hypotension  Dysautonomia  IST (Inappropriate Sinus Tachycardia)      TESTING    -ECHO/ TTE:  (2/22/22) LVEF 60%.        Objective   Physical Exam  /74 (BP Location: Right arm)   Pulse 76   Ht 1.727 m (5' 8\")   Wt 91.2 kg (201 lb)   LMP 05/07/2024 (Exact Date)   SpO2 98%   BMI 30.56 kg/m²     General Appearance:  Alert, cooperative, no distress, appears stated age   Head:  Normocephalic, without obvious abnormality, atraumatic   Eyes:  PERRL, conjunctiva/corneas clear, EOM's intact, fundi benign, both eyes   Ears:  Normal TM's and external ear canals, both ears   Nose: Nares normal, septum midline,mucosa normal, no drainage or sinus tenderness   Throat: Lips, mucosa, and tongue normal; teeth and gums normal   Neck: Supple, symmetrical, trachea midline, no adenopathy;  thyroid: not enlarged, " symmetric, no tenderness/mass/nodules; no carotid bruit or JVD   Back:   Symmetric, no curvature, ROM normal, no CVA tenderness   Lungs:   Clear to auscultation bilaterally, respirations unlabored   Breasts:  No masses or tenderness   Heart:  Regular rate and rhythm, S1 and S2 normal, no murmur, rub, or gallop   Abdomen:   Soft, non-tender, bowel sounds active all four quadrants,  no masses, no organomegaly   Pelvic: Deferred   Extremities: Extremities normal, atraumatic, no cyanosis or edema   Pulses: 2+ and symmetric   Skin: Skin color, texture, turgor normal, no rashes or lesions   Lymph nodes: Cervical, supraclavicular, and axillary nodes normal   Neurologic: Normal           Assessment/Plan   Her symptoms of fatigue, palpitations started after having significant COVID requiring intubation in the early days of COVID pandemic. She is some days completely wiped out and the heart rate is elevated .     We did discuss the etiology of the symptoms - she has dysautonomia that underlines and is responsible for her fatigue and faster heart rate. Methylphenidate does also contributes to her faster heart rate. We discussed the non pharmacological and pharmacological therapy. We discussed increase fluid intake, compression stockings and exercise. Exercise is especially critical. We will also do a trial of Ivabradine.

## 2024-06-04 ENCOUNTER — PHARMACY VISIT (OUTPATIENT)
Dept: PHARMACY | Facility: CLINIC | Age: 29
End: 2024-06-04
Payer: COMMERCIAL

## 2024-06-10 ENCOUNTER — APPOINTMENT (OUTPATIENT)
Dept: OBSTETRICS AND GYNECOLOGY | Facility: CLINIC | Age: 29
End: 2024-06-10
Payer: COMMERCIAL

## 2024-06-11 ENCOUNTER — APPOINTMENT (OUTPATIENT)
Dept: OBSTETRICS AND GYNECOLOGY | Facility: CLINIC | Age: 29
End: 2024-06-11
Payer: COMMERCIAL

## 2024-06-26 ENCOUNTER — APPOINTMENT (OUTPATIENT)
Dept: CARDIOLOGY | Facility: CLINIC | Age: 29
End: 2024-06-26
Payer: COMMERCIAL

## 2024-07-12 ENCOUNTER — PHARMACY VISIT (OUTPATIENT)
Dept: PHARMACY | Facility: CLINIC | Age: 29
End: 2024-07-12
Payer: COMMERCIAL

## 2024-07-12 PROCEDURE — RXMED WILLOW AMBULATORY MEDICATION CHARGE

## 2024-07-16 ENCOUNTER — APPOINTMENT (OUTPATIENT)
Dept: OBSTETRICS AND GYNECOLOGY | Facility: CLINIC | Age: 29
End: 2024-07-16
Payer: COMMERCIAL

## 2024-07-30 ENCOUNTER — APPOINTMENT (OUTPATIENT)
Dept: OBSTETRICS AND GYNECOLOGY | Facility: CLINIC | Age: 29
End: 2024-07-30
Payer: COMMERCIAL

## 2024-07-30 DIAGNOSIS — F90.0 ATTENTION DEFICIT HYPERACTIVITY DISORDER (ADHD), PREDOMINANTLY INATTENTIVE TYPE: ICD-10-CM

## 2024-07-30 RX ORDER — METHYLPHENIDATE HYDROCHLORIDE 36 MG/1
36 TABLET ORAL EVERY MORNING
Qty: 30 TABLET | Refills: 0 | Status: SHIPPED | OUTPATIENT
Start: 2024-07-30 | End: 2024-08-29

## 2024-07-30 NOTE — TELEPHONE ENCOUNTER
Patient phones the office today w/ request to refill her Methylphenidate ER 36mg ER 1 TAB QAM to be sent to [facility] on [address] in [place].     If refill not okay, please advise a good date/time to get patient into the office early than October please.     Thank you.

## 2024-08-06 ENCOUNTER — APPOINTMENT (OUTPATIENT)
Dept: CARDIOLOGY | Facility: CLINIC | Age: 29
End: 2024-08-06
Payer: COMMERCIAL

## 2024-08-06 ENCOUNTER — OFFICE VISIT (OUTPATIENT)
Dept: PRIMARY CARE | Facility: CLINIC | Age: 29
End: 2024-08-06
Payer: COMMERCIAL

## 2024-08-06 VITALS
TEMPERATURE: 97.1 F | DIASTOLIC BLOOD PRESSURE: 74 MMHG | HEART RATE: 108 BPM | SYSTOLIC BLOOD PRESSURE: 126 MMHG | HEIGHT: 68 IN | RESPIRATION RATE: 16 BRPM | WEIGHT: 202 LBS | OXYGEN SATURATION: 98 % | BODY MASS INDEX: 30.62 KG/M2

## 2024-08-06 DIAGNOSIS — Z01.818 TUBAL LIGATION EVALUATION: Primary | ICD-10-CM

## 2024-08-06 DIAGNOSIS — J45.30 MILD PERSISTENT ASTHMA WITHOUT COMPLICATION (HHS-HCC): ICD-10-CM

## 2024-08-06 DIAGNOSIS — E87.6 HYPOKALEMIA: ICD-10-CM

## 2024-08-06 DIAGNOSIS — I47.11 INAPPROPRIATE SINUS TACHYCARDIA (CMS-HCC): ICD-10-CM

## 2024-08-06 DIAGNOSIS — F90.0 ATTENTION DEFICIT HYPERACTIVITY DISORDER (ADHD), PREDOMINANTLY INATTENTIVE TYPE: ICD-10-CM

## 2024-08-06 DIAGNOSIS — F32.A ANXIETY AND DEPRESSION: ICD-10-CM

## 2024-08-06 DIAGNOSIS — D64.9 ANEMIA, UNSPECIFIED TYPE: ICD-10-CM

## 2024-08-06 DIAGNOSIS — F41.9 ANXIETY AND DEPRESSION: ICD-10-CM

## 2024-08-06 DIAGNOSIS — G90.1 DYSAUTONOMIA (MULTI): ICD-10-CM

## 2024-08-06 PROCEDURE — 3008F BODY MASS INDEX DOCD: CPT | Performed by: FAMILY MEDICINE

## 2024-08-06 PROCEDURE — 99214 OFFICE O/P EST MOD 30 MIN: CPT | Performed by: FAMILY MEDICINE

## 2024-08-06 PROCEDURE — 1036F TOBACCO NON-USER: CPT | Performed by: FAMILY MEDICINE

## 2024-08-06 RX ORDER — LEVALBUTEROL TARTRATE 45 UG/1
2 AEROSOL, METERED ORAL EVERY 6 HOURS PRN
Qty: 15 G | Refills: 3 | Status: SHIPPED | OUTPATIENT
Start: 2024-08-06

## 2024-08-06 RX ORDER — VENLAFAXINE HYDROCHLORIDE 150 MG/1
150 CAPSULE, EXTENDED RELEASE ORAL 2 TIMES DAILY
Qty: 180 CAPSULE | Refills: 1 | Status: SHIPPED | OUTPATIENT
Start: 2024-08-06 | End: 2024-10-05

## 2024-08-06 NOTE — PROGRESS NOTES
Subjective   Patient ID: Sienna Cabrera is a 28 y.o. female who presents for Med Management and Contraception (Discuss options).  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    Hypotension    Impaired cognition    Abnormal gait    Inappropriate sinus tachycardia (CMS-HCC)    Insomnia due to medical condition    Macrosomia (HHS-HCC)    Memory loss of unknown cause    Mild persistent asthma without complication (HHS-HCC)    Numbness and tingling    Obesity    Overweight with body mass index (BMI) 25.0-29.9    Palpitations    Poor concentration    Post covid-19 condition, unspecified    Shortness of breath    Tendinitis of right rotator cuff    Vitamin D deficiency    Paresis of lower extremity (Multi)    Hypokalemia    Medication course changed       Past Surgical History:   Procedure Laterality Date    MR HEAD ANGIO WO IV CONTRAST  12/08/2021    MR HEAD ANGIO WO IV CONTRAST 12/8/2021 [facility]    MR NECK ANGIO WO IV CONTRAST  12/08/2021    MR NECK ANGIO WO IV CONTRAST 12/8/2021 [facility]    TONSILLECTOMY      with adenoidectomy       Review of Systems  This patient has  NO history of seizures/ CAD or CVA    NO history of recent Covid nor flu symptoms,  NO Fever nor chills,  NO Chest pain, shortness of breath nor paroxysmal nocturnal dyspnea,  NO Nausea, vomiting, nor diarrhea,  NO Hematochezia nor melena,  NO Dysuria, hematuria, nor new incontinence issues  NO new severe headaches nor neurological complaints,  NO new issues with anxiety nor depression  "nor new psychiatric complaints,  NO suicidal nor homicidal ideations.     OBJECTIVE:  /74   Pulse 108   Temp 36.2 °C (97.1 °F) (Temporal)   Resp 16   Ht 1.727 m (5' 8\")   Wt 91.6 kg (202 lb)   LMP 07/11/2024 (Exact Date)   SpO2 98%   BMI 30.71 kg/m²      General:  alert, oriented, no acute distress.  No obvious skin rashes noted.   No gait disturbance noted.    Mood is pleasant,  no signs of emotional distress. Occ tearful    Not appearing intoxicated or altered.   No voiced delusions,   Normal, appropriate behavior.    HEENT: Normocephalic, atraumatic,   Pupils round, reactive to light  Extraocular motions intact and wnl  Tympanic membranes normal    Neck: no nuchal rigidity  No masses palpable.  No carotid bruits.  No thyromegaly.    Respiratory: Equal breath sounds  No wheezes,    rales,    nor rhonchi  No respiratory distress.    Heart: Regular rate and rhythm, no    murmurs  no rubs/gallops    Abdomen: no masses palpable, nontender, no rebound nor guarding.  ovwt  Extremities: NO cyanosis noted, no clubbing.   No edema noted.  2+dorsalis pedis pulses.    Normal-not antalgic, steady gait.    Office Visit on 05/31/2024   Component Date Value Ref Range Status    Ventricular Rate 05/31/2024 78  BPM Final    Atrial Rate 05/31/2024 78  BPM Final    VA Interval 05/31/2024 128  ms Final    QRS Duration 05/31/2024 94  ms Final    QT Interval 05/31/2024 366  ms Final    QTC Calculation(Bazett) 05/31/2024 417  ms Final    P Axis 05/31/2024 19  degrees Final    R Axis 05/31/2024 43  degrees Final    T Axis 05/31/2024 35  degrees Final    QRS Count 05/31/2024 12  beats Final    Q Onset 05/31/2024 218  ms Final    P Onset 05/31/2024 154  ms Final    P Offset 05/31/2024 211  ms Final    T Offset 05/31/2024 401  ms Final    QTC Fredericia 05/31/2024 399  ms Final   Lab on 05/14/2024   Component Date Value Ref Range Status    Glucose 05/14/2024 87  74 - 99 mg/dL Final    Sodium 05/14/2024 140  136 - 145 mmol/L " Final    Potassium 05/14/2024 3.9  3.5 - 5.3 mmol/L Final    Chloride 05/14/2024 105  98 - 107 mmol/L Final    Bicarbonate 05/14/2024 26  21 - 32 mmol/L Final    Anion Gap 05/14/2024 13  10 - 20 mmol/L Final    Urea Nitrogen 05/14/2024 14  6 - 23 mg/dL Final    Creatinine 05/14/2024 0.69  0.50 - 1.05 mg/dL Final    eGFR 05/14/2024 >90  >60 mL/min/1.73m*2 Final    Calculations of estimated GFR are performed using the 2021 CKD-EPI Study Refit equation without the race variable for the IDMS-Traceable creatinine methods.  https://jasn.asnjournals.org/content/early/2021/09/22/ASN.7127901074    Calcium 05/14/2024 9.3  8.6 - 10.3 mg/dL Final    Albumin 05/14/2024 4.5  3.4 - 5.0 g/dL Final    Alkaline Phosphatase 05/14/2024 113 (H)  33 - 110 U/L Final    Total Protein 05/14/2024 7.3  6.4 - 8.2 g/dL Final    AST 05/14/2024 14  9 - 39 U/L Final    Bilirubin, Total 05/14/2024 0.6  0.0 - 1.2 mg/dL Final    ALT 05/14/2024 16  7 - 45 U/L Final    Patients treated with Sulfasalazine may generate falsely decreased results for ALT.    Hemoglobin A1C 05/14/2024 5.2  see below % Final    Estimated Average Glucose 05/14/2024 103  Not Established mg/dL Final    Vitamin B12 05/14/2024 302  211 - 911 pg/mL Final    Vit D, 1,25-Dihydroxy 05/14/2024 73.9  19.9 - 79.3 pg/mL Final    INTERPRETIVE INFORMATION: Vitamin D, 1,25-Dihydroxy    This test is primarily indicated during patient evaluation for   hypercalcemia and renal failure. A normal result does not rule out   Vitamin D deficiency. The recommended test for diagnosing Vitamin   D deficiency is Vitamin D 25-hydroxy.  Performed By: [facility]  [address] [place] [id number]  : Spencer Snyder MD, PhD  CLIA Number: [id number]    Ferritin 05/14/2024 42  8 - 150 ng/mL Final    Thyroid Stimulating Hormone 05/14/2024 1.94  0.44 - 3.98 mIU/L Final    Thyroxine, Free 05/14/2024 0.88  0.61 - 1.12 ng/dL Final        Assessment/Plan     Problem List Items  Addressed This Visit       Anemia    Anxiety and depression    Attention deficit hyperactivity disorder (ADHD), predominantly inattentive type    Relevant Orders    Follow Up In [facility] - PCP - Established    Dysautonomia (Multi)    Inappropriate sinus tachycardia (CMS-HCC)    Mild persistent asthma without complication ([facility])    Relevant Medications    levalbuterol (Xopenex) 45 mcg/actuation inhaler    Hypokalemia       Follow up at next scheduled visit -as planned    During beginning of covid had some concern over septic emboli  Does not mean DVT  But rba ocps addressed  Up effexor to 300 (from 225)  Needs counseling  I have discussed the collaborative care model for this patient’s behavioral health care. Written detailed information and identifying the members of this care team was provided to patient. They give permission for the Behavioral Health Manager (BHM) and psychiatric consultant to be included in their care with my continued primary management. Patient made aware that services provided as part of the Collaborative Care Model are subject to cost sharing.  Overuse and abuse potential discussed with patient (parents if applicable)  If mood deterioration, status change etc on medicine, suicidal or homicidal ideations -patient agrees to proceed with crisis plan-in place-including-not limited to contacting family, our office and or proceeding to ER.  Reviewed controlled substance agreement - including but not limited to the risks-benefits-alternatives to treatment with a controlled substance medication(s).  It is recommended that OARRS reports be checked and patient have appointment at least every 3months(6 for certain medicines only)  If the agreement signed (controlled substance agreement) is violated prescriptions may be stopped abruptly and patient /family understands and agrees to this.  Another reason for termination of agreement is if we have concern for abuse or overuse and it is also  recommended that patient take responsibility to try to taper and minimize use of these medicines frequently trying to limit or gradually taper to discontinuation.    Patient is aware that side effects such as insomnia, unexpected weight changes are unexpected and should result in discontinuation.  Always use caution and AVOID operating machinery(driving etc) on pain medicines or CNS depressants and avoid combining together OR with alcohol. If opioids are prescribed patient understands the benefits of narcan and was offered prescription.  Follow up sooner if condition deteriorates or problems arise.    Agrees to regular follow and counseling for maximum benefits.  Likes her new heart med(corlanor)-helps  dysautonomia  Counseled a bit at length  Overwhelmed often  See me 6mo

## 2024-08-09 ENCOUNTER — TELEPHONE (OUTPATIENT)
Dept: PRIMARY CARE | Facility: CLINIC | Age: 29
End: 2024-08-09
Payer: COMMERCIAL

## 2024-08-09 DIAGNOSIS — R41.840 INATTENTION: ICD-10-CM

## 2024-08-09 DIAGNOSIS — F41.9 ANXIETY: Primary | ICD-10-CM

## 2024-08-14 ENCOUNTER — TELEPHONE (OUTPATIENT)
Dept: PRIMARY CARE | Facility: CLINIC | Age: 29
End: 2024-08-14
Payer: COMMERCIAL

## 2024-08-14 NOTE — PROGRESS NOTES
Writer outreached pt regarding their referral to Collaborative Care. Explained program and answered pt's questions. Pt requested to schedule future initial assessment. Pt is scheduled for 8/19 @ 1pm via phone.

## 2024-08-19 ENCOUNTER — SOCIAL WORK (OUTPATIENT)
Dept: PRIMARY CARE | Facility: CLINIC | Age: 29
End: 2024-08-19
Payer: COMMERCIAL

## 2024-08-19 ENCOUNTER — APPOINTMENT (OUTPATIENT)
Dept: OBSTETRICS AND GYNECOLOGY | Facility: CLINIC | Age: 29
End: 2024-08-19
Payer: COMMERCIAL

## 2024-08-19 ENCOUNTER — OFFICE VISIT (OUTPATIENT)
Dept: OBSTETRICS AND GYNECOLOGY | Facility: CLINIC | Age: 29
End: 2024-08-19
Payer: COMMERCIAL

## 2024-08-19 ENCOUNTER — PREP FOR PROCEDURE (OUTPATIENT)
Dept: OBSTETRICS AND GYNECOLOGY | Facility: CLINIC | Age: 29
End: 2024-08-19

## 2024-08-19 VITALS
HEIGHT: 68 IN | SYSTOLIC BLOOD PRESSURE: 108 MMHG | BODY MASS INDEX: 30.49 KG/M2 | DIASTOLIC BLOOD PRESSURE: 76 MMHG | WEIGHT: 201.2 LBS

## 2024-08-19 VITALS
WEIGHT: 202 LBS | HEIGHT: 68 IN | DIASTOLIC BLOOD PRESSURE: 83 MMHG | BODY MASS INDEX: 30.62 KG/M2 | SYSTOLIC BLOOD PRESSURE: 124 MMHG

## 2024-08-19 DIAGNOSIS — Z01.419 ENCNTR FOR GYN EXAM (GENERAL) (ROUTINE) W/O ABN FINDINGS: Primary | ICD-10-CM

## 2024-08-19 DIAGNOSIS — E66.9 OBESITY (BMI 30-39.9): ICD-10-CM

## 2024-08-19 DIAGNOSIS — Z12.4 SCREENING FOR CERVICAL CANCER: ICD-10-CM

## 2024-08-19 DIAGNOSIS — Z30.2 REQUEST FOR STERILIZATION: Primary | ICD-10-CM

## 2024-08-19 DIAGNOSIS — Z30.2 STERILIZATION: Primary | ICD-10-CM

## 2024-08-19 PROCEDURE — 1036F TOBACCO NON-USER: CPT | Performed by: ADVANCED PRACTICE MIDWIFE

## 2024-08-19 PROCEDURE — 99204 OFFICE O/P NEW MOD 45 MIN: CPT | Performed by: OBSTETRICS & GYNECOLOGY

## 2024-08-19 PROCEDURE — 87624 HPV HI-RISK TYP POOLED RSLT: CPT

## 2024-08-19 PROCEDURE — 99395 PREV VISIT EST AGE 18-39: CPT | Performed by: ADVANCED PRACTICE MIDWIFE

## 2024-08-19 PROCEDURE — 1036F TOBACCO NON-USER: CPT | Performed by: OBSTETRICS & GYNECOLOGY

## 2024-08-19 PROCEDURE — 3008F BODY MASS INDEX DOCD: CPT | Performed by: ADVANCED PRACTICE MIDWIFE

## 2024-08-19 PROCEDURE — 3008F BODY MASS INDEX DOCD: CPT | Performed by: OBSTETRICS & GYNECOLOGY

## 2024-08-19 RX ORDER — GABAPENTIN 600 MG/1
600 TABLET ORAL ONCE
OUTPATIENT
Start: 2024-08-19 | End: 2024-08-19

## 2024-08-19 RX ORDER — ACETAMINOPHEN 325 MG/1
975 TABLET ORAL ONCE
OUTPATIENT
Start: 2024-08-19 | End: 2024-08-19

## 2024-08-19 RX ORDER — TRANEXAMIC ACID 650 MG/1
1300 TABLET ORAL ONCE
OUTPATIENT
Start: 2024-08-19 | End: 2024-08-19

## 2024-08-19 RX ORDER — SODIUM CHLORIDE, SODIUM LACTATE, POTASSIUM CHLORIDE, CALCIUM CHLORIDE 600; 310; 30; 20 MG/100ML; MG/100ML; MG/100ML; MG/100ML
20 INJECTION, SOLUTION INTRAVENOUS CONTINUOUS
OUTPATIENT
Start: 2024-08-19

## 2024-08-19 RX ORDER — CELECOXIB 400 MG/1
400 CAPSULE ORAL ONCE
OUTPATIENT
Start: 2024-08-19 | End: 2024-08-19

## 2024-08-19 ASSESSMENT — PATIENT HEALTH QUESTIONNAIRE - PHQ9
7. TROUBLE CONCENTRATING ON THINGS, SUCH AS READING THE NEWSPAPER OR WATCHING TELEVISION: NEARLY EVERY DAY
3. TROUBLE FALLING OR STAYING ASLEEP: NEARLY EVERY DAY
4. FEELING TIRED OR HAVING LITTLE ENERGY: NEARLY EVERY DAY
10. IF YOU CHECKED OFF ANY PROBLEMS, HOW DIFFICULT HAVE THESE PROBLEMS MADE IT FOR YOU TO DO YOUR WORK, TAKE CARE OF THINGS AT HOME, OR GET ALONG WITH OTHER PEOPLE: EXTREMELY DIFFICULT
1. LITTLE INTEREST OR PLEASURE IN DOING THINGS: NEARLY EVERY DAY
9. THOUGHTS THAT YOU WOULD BE BETTER OFF DEAD, OR OF HURTING YOURSELF: SEVERAL DAYS
SUM OF ALL RESPONSES TO PHQ QUESTIONS 1-9: 19
6. FEELING BAD ABOUT YOURSELF - OR THAT YOU ARE A FAILURE OR HAVE LET YOURSELF OR YOUR FAMILY DOWN: NEARLY EVERY DAY
SUM OF ALL RESPONSES TO PHQ9 QUESTIONS 1 & 2: 6
2. FEELING DOWN, DEPRESSED OR HOPELESS: NEARLY EVERY DAY
5. POOR APPETITE OR OVEREATING: NOT AT ALL
8. MOVING OR SPEAKING SO SLOWLY THAT OTHER PEOPLE COULD HAVE NOTICED. OR THE OPPOSITE, BEING SO FIGETY OR RESTLESS THAT YOU HAVE BEEN MOVING AROUND A LOT MORE THAN USUAL: NOT AT ALL

## 2024-08-19 ASSESSMENT — ANXIETY QUESTIONNAIRES
7. FEELING AFRAID AS IF SOMETHING AWFUL MIGHT HAPPEN: NOT AT ALL
6. BECOMING EASILY ANNOYED OR IRRITABLE: SEVERAL DAYS
3. WORRYING TOO MUCH ABOUT DIFFERENT THINGS: SEVERAL DAYS
GAD7 TOTAL SCORE: 7
2. NOT BEING ABLE TO STOP OR CONTROL WORRYING: SEVERAL DAYS
4. TROUBLE RELAXING: NEARLY EVERY DAY
1. FEELING NERVOUS, ANXIOUS, OR ON EDGE: SEVERAL DAYS
IF YOU CHECKED OFF ANY PROBLEMS ON THIS QUESTIONNAIRE, HOW DIFFICULT HAVE THESE PROBLEMS MADE IT FOR YOU TO DO YOUR WORK, TAKE CARE OF THINGS AT HOME, OR GET ALONG WITH OTHER PEOPLE: SOMEWHAT DIFFICULT
5. BEING SO RESTLESS THAT IT IS HARD TO SIT STILL: NOT AT ALL

## 2024-08-19 NOTE — PROGRESS NOTES
"GYNECOLOGY PROGRESS NOTE        CC:  Patient seen today for her annual visit. Patient was made aware that the services done today may not be covered due to the fact that we are listed as one speciality and she was seen earlier at our [place] office for a tubal consult with . Patient agrees to proceed with the exam and she wants pap testing done today too. No issues or concerns today. No breast issues. No concerns about infections.   Chief Complaint   Patient presents with    Annual Exam     Est pt for annual  Pap 4/22 neg  No concerns  Pt seen Dr nathan today for a tubal consult       HPI:  Sienna Cabrera is here for a routine GYN examination.  No GYN c/o, no AUB.      Depression screen:  negative      ROS:  GI - no blood in BMs  URO - no hematuria  GYN - no AUB or vaginal discharge  PSYCH - mood OK        PHYSICAL EXAM:  /83 (BP Location: Right arm, Patient Position: Sitting)   Ht 1.727 m (5' 8\")   Wt 91.6 kg (202 lb)   LMP 08/13/2024 (Exact Date)   BMI 30.71 kg/m²   GEN:  A&O, NAD  URO:  normal urethra, no bladder TTP  GYN:  normal vulva and perineum w/o lesions or ulcers, normal vagina without discharge or lesions, normal cervix without lesions or discharge or CMT, uterus NT/NE, adnexa mobile and NT/NE  BREAST:  no masses or TTP, no skin lesions or nipple discharge  PSYCH:  normal affect, non-anxious      IMPRESSION/PLAN:    A: normal gyn exam today.  Plan: 1. Pap today and if wnl may repeat 1-5 years. 2. Follow up 1 year or prn.  Problem List Items Addressed This Visit    None       Pap and HPV normal in 2022 no Hx of HGSIL.   ASCCP pap smear screening guidelines reviewed with the patient.        Noy Enriquez, DANNY-MARY  "
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
## 2024-08-19 NOTE — PROGRESS NOTES
"GYNECOLOGY PROGRESS NOTE          CC:     Chief Complaint   Patient presents with    Consult     Here today for tubal consult  G 3 P 3 - all vaginal delivery  Pap 4/2022   BC - nothing         HPI:  Sienna Cabrera is scheduled today for office visit for sterilization consultation.     Childbearing completed.  No GYN c/o today.  Has chronic heavier cycles since birth of her last child.  Patient 100% certain about decision for sterilization.  No issues previously with surgery or anesthesia.  NOT breastfeeding.  Current BC is none.      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      Comment:  Covid infection at 12 weeks of pregnancy  2020: COVID  04/2022: Pap test, as part of routine gynecological examination      Comment:  wnl   Past Surgical History:   Procedure Laterality Date    MR HEAD ANGIO WO IV CONTRAST  12/08/2021    MR HEAD ANGIO WO IV CONTRAST 12/8/2021 [facility]    MR NECK ANGIO WO IV CONTRAST  12/08/2021    MR NECK ANGIO WO IV CONTRAST 12/8/2021 [facility]    TONSILLECTOMY      with adenoidectomy     Social History     Tobacco Use    Smoking status: Never    Smokeless tobacco: Never   Substance Use Topics    Alcohol use: Not Currently    Drug use: Never          PHYSICAL EXAM:  /76 (BP Location: Left arm, Patient Position: Sitting)   Ht 1.727 m (5' 8\")   Wt 91.3 kg (201 lb 3.2 oz)   LMP 08/13/2024 (Exact Date)   BMI 30.59 kg/m²    GEN:  A&O, NAD  ABD:  no umbilical hernias  PSYCH:  normal affect, non-anxious      IMPRESSION/PLAN:    Problem List Items Addressed This Visit    None  Visit Diagnoses       Request for sterilization    -  Primary    Obesity (BMI 30-39.9)               Sterilization consultation:  All alternatives to sterilization reviewed, including hormonal birth control and LARCs and vasectomy. Patient 100% certain about " her decision to proceed. Surgical options for sterilization reviewed--including Filshie clips, bilateral partial salpingectomy, and complete salpingectomy. Decrease in future ovarian cancer risk with complete salpingectomy reviewed with patient, patient elects to proceed with this surgical option.  Reversal options reviewed (and usually self-pay, none and need for IVF if complete salpingectomy).  [facility] sterilization papers to be signed today, mandatory 30-day waiting period reviewed.   Surgical consent signed today, surgery risks reviewed (bleeding, infection, anesthesia complications, sterilization regret, increased ectopic pregnancy risk, injury to other tissues and organs or vessels, adhesions, need for re-operation).  Preoperative and postoperative ERAS protocol and Rx reviewed with the patient and use/AE reviewed.  Surgery comorbidities include obesity.  Preop labs ordered.  COVID and surgery counseling done.  Would accept a blood transfusion.      Nico Baugh, DO

## 2024-08-26 ENCOUNTER — APPOINTMENT (OUTPATIENT)
Dept: PRIMARY CARE | Facility: CLINIC | Age: 29
End: 2024-08-26
Payer: COMMERCIAL

## 2024-08-26 ENCOUNTER — TELEPHONE (OUTPATIENT)
Dept: PRIMARY CARE | Facility: CLINIC | Age: 29
End: 2024-08-26

## 2024-08-26 NOTE — PATIENT INSTRUCTIONS
Writer to outreach patient this coming Thursday with psych recommendations. To reschedule sessions at that time, hopefully something in person or VV

## 2024-08-29 ENCOUNTER — DOCUMENTATION (OUTPATIENT)
Dept: BEHAVIORAL HEALTH | Facility: CLINIC | Age: 29
End: 2024-08-29
Payer: COMMERCIAL

## 2024-08-29 ENCOUNTER — TELEPHONE (OUTPATIENT)
Dept: PRIMARY CARE | Facility: CLINIC | Age: 29
End: 2024-08-29
Payer: COMMERCIAL

## 2024-08-29 NOTE — PROGRESS NOTES
[facility] Psychiatry Consult Note     Sienna Cabrera is a 28 y.o., referred to Collaborative Care for symptoms of depression and anxiety. I have reviewed the patient with the behavioral health manager and reviewed the patient's electronic record. Many years of mental health dating back to childhood. History of trauma. No prior therapy.    Current Meds:  Effexor XR 150mg twice daily - no side effects, taking for about 8 years - feels sometimes like medication helps, but also feels like her problems are not much better  Concerta 36mg daily    Past Meds:  Escitalopram - made anxiety and depression worse    Recommendations:   Cont Effexor XR  Add Wellbutrin XL 150mg daily for depression adjunctive therapy - can increase to 300mg after 4 weeks if helping, but still symptomatic with depression  Learn more about Concerta      Patient Health Questionnaire-9 Score: 19 (8/19/2024  1:18 PM)  ANUM-7 Total Score: 7 (8/19/2024  1:25 PM)    The above treatment considerations and suggestions are based on consultations with the patient's care manager and a review of information available in the electronic medical record. I have not personally examined the patient. All recommendations should be implemented with consideration of the patient's relevant prior history and current clinical status. Please feel free to call me with any questions about the care of this patient.

## 2024-08-30 ENCOUNTER — TELEPHONE (OUTPATIENT)
Dept: PRIMARY CARE | Facility: CLINIC | Age: 29
End: 2024-08-30
Payer: COMMERCIAL

## 2024-08-30 ENCOUNTER — DOCUMENTATION (OUTPATIENT)
Dept: PRIMARY CARE | Facility: CLINIC | Age: 29
End: 2024-08-30
Payer: COMMERCIAL

## 2024-08-30 DIAGNOSIS — F32.A ANXIETY AND DEPRESSION: Primary | ICD-10-CM

## 2024-08-30 DIAGNOSIS — F41.9 ANXIETY AND DEPRESSION: Primary | ICD-10-CM

## 2024-08-30 RX ORDER — BUPROPION HYDROCHLORIDE 150 MG/1
150 TABLET ORAL EVERY MORNING
Qty: 30 TABLET | Refills: 5 | Status: SHIPPED | OUTPATIENT
Start: 2024-08-30 | End: 2025-02-26

## 2024-08-30 NOTE — PROGRESS NOTES
Patient is requesting to move forward with adding Wellbutrin XL 150mg for depression.     Please advise, thank you.     Recommendations:   Cont Effexor XR  Add Wellbutrin XL 150mg daily for depression adjunctive therapy - can increase to 300mg after 4 weeks if helping, but still symptomatic with depression

## 2024-08-30 NOTE — PROGRESS NOTES
Patient Education     Diet: Diabetes  Food is an important tool that you can use to control diabetes and stay healthy. Eating well-balanced meals in the correct amounts will help you control your blood glucose levels and prevent low blood sugar reactions. It will also help you reduce the health risks of diabetes. There is no one specific diet that is right for everyone with diabetes. But there are general guidelines to follow. A registered dietitian (RD) will create a tailored diet approach that’s just right for you. He or she will also help you plan healthy meals and snacks. If you have any questions, call your dietitian for advice.  Guidelines for success  Talk with your healthcare provider before starting a diabetes diet or weight loss program. If you haven't talked with a dietitian yet, ask your provider for a referral. The following guidelines can help you succeed:  · Select foods from the 6 food groups below. Your dietitian will help you find food choices within each group. He or she will also show you serving sizes and how many servings you can have at each meal.  ? Grains, beans, and starchy vegetables  ? Vegetables  ? Fruit  ? Milk or yogurt  ? Meat, poultry, fish, or tofu  ? Healthy fats  · Check your blood sugar levels as directed by your provider. Take any medicine as prescribed by your provider.  · Learn to read food labels and pick the right portion sizes.  · Limit carbohydrates at each meal to help manage your diabetes. The carbohydrates you eat become glucose in the blood. Talk with your healthcare provider about how many grams of carbohydrates are recommended for you at each meal. Eat 3 meals a day, at consistent times. Don't skip meals. If you are hungry between meals, eat a small, low-carbohydrate snack.  · Talk with your healthcare provider if you drink alcohol. Alcohol can have unpredictable effects on blood glucose. It's also high in empty calories and can raise a type of blood fat called  Paul arreaga    triglycerides. Drink water or calorie-free diet drinks instead.  · Eat less fat to help lower your risk of heart disease. Use nonfat or low-fat dairy products and lean meats. Avoid fried foods. Use cooking oils that are unsaturated, such as olive, canola, or peanut oil.  · Don't eat foods with added salt. Salt can contribute to high blood pressure, which can cause heart disease. People with diabetes already have a risk for high blood pressure and heart disease.  · Stay at a healthy weight. If you need to lose weight, cut down on your portion sizes. But don’t skip meals. Exercise is an important part of any weight management program. Talk with your provider about an exercise program that’s right for you.  · For more information about the best diet plan for you, talk with an RD. To find an RD in your area, contact:  ? Academy of Nutrition and Dietetics www.eatright.org  ? American Diabetes Association 988-815-9490 www.diabetes.org  Silvestre last reviewed this educational content on 7/1/2019 © 2000-2021 [employer]. All rights reserved. This information is not intended as a substitute for professional medical care. Always follow your healthcare professional's instructions.

## 2024-09-05 ENCOUNTER — APPOINTMENT (OUTPATIENT)
Dept: OBSTETRICS AND GYNECOLOGY | Facility: CLINIC | Age: 29
End: 2024-09-05
Payer: COMMERCIAL

## 2024-09-10 NOTE — PREPROCEDURE INSTRUCTIONS
Pre-op instructions reviewed with Pt. including NPO status, may have clear liquids up to 2 hours prior to procedure, where to check in for procedure and having  available after.

## 2024-09-11 ENCOUNTER — LAB (OUTPATIENT)
Dept: LAB | Facility: LAB | Age: 29
End: 2024-09-11
Payer: COMMERCIAL

## 2024-09-11 DIAGNOSIS — Z30.2 STERILIZATION: ICD-10-CM

## 2024-09-11 LAB
ABO GROUP (TYPE) IN BLOOD: NORMAL
ANION GAP SERPL CALC-SCNC: 13 MMOL/L (ref 10–20)
ANTIBODY SCREEN: NORMAL
BUN SERPL-MCNC: 10 MG/DL (ref 6–23)
CALCIUM SERPL-MCNC: 9.5 MG/DL (ref 8.6–10.3)
CHLORIDE SERPL-SCNC: 106 MMOL/L (ref 98–107)
CO2 SERPL-SCNC: 24 MMOL/L (ref 21–32)
CREAT SERPL-MCNC: 0.72 MG/DL (ref 0.5–1.05)
EGFRCR SERPLBLD CKD-EPI 2021: >90 ML/MIN/1.73M*2
ERYTHROCYTE [DISTWIDTH] IN BLOOD BY AUTOMATED COUNT: 13 % (ref 11.5–14.5)
GLUCOSE SERPL-MCNC: 99 MG/DL (ref 74–99)
HCT VFR BLD AUTO: 44.9 % (ref 36–46)
HGB BLD-MCNC: 14.8 G/DL (ref 12–16)
MCH RBC QN AUTO: 28.2 PG (ref 26–34)
MCHC RBC AUTO-ENTMCNC: 33 G/DL (ref 32–36)
MCV RBC AUTO: 86 FL (ref 80–100)
NRBC BLD-RTO: 0 /100 WBCS (ref 0–0)
PLATELET # BLD AUTO: 325 X10*3/UL (ref 150–450)
POTASSIUM SERPL-SCNC: 4.1 MMOL/L (ref 3.5–5.3)
RBC # BLD AUTO: 5.24 X10*6/UL (ref 4–5.2)
RH FACTOR (ANTIGEN D): NORMAL
SODIUM SERPL-SCNC: 139 MMOL/L (ref 136–145)
WBC # BLD AUTO: 7.1 X10*3/UL (ref 4.4–11.3)

## 2024-09-11 PROCEDURE — 36415 COLL VENOUS BLD VENIPUNCTURE: CPT

## 2024-09-11 PROCEDURE — 86850 RBC ANTIBODY SCREEN: CPT

## 2024-09-11 PROCEDURE — 85027 COMPLETE CBC AUTOMATED: CPT

## 2024-09-11 PROCEDURE — 86901 BLOOD TYPING SEROLOGIC RH(D): CPT

## 2024-09-11 PROCEDURE — 86900 BLOOD TYPING SEROLOGIC ABO: CPT

## 2024-09-11 PROCEDURE — 80048 BASIC METABOLIC PNL TOTAL CA: CPT

## 2024-09-12 ENCOUNTER — ANESTHESIA EVENT (OUTPATIENT)
Dept: OPERATING ROOM | Facility: HOSPITAL | Age: 29
End: 2024-09-12
Payer: COMMERCIAL

## 2024-09-12 ENCOUNTER — TELEPHONE (OUTPATIENT)
Dept: PRIMARY CARE | Facility: CLINIC | Age: 29
End: 2024-09-12

## 2024-09-12 ENCOUNTER — APPOINTMENT (OUTPATIENT)
Dept: PRIMARY CARE | Facility: CLINIC | Age: 29
End: 2024-09-12
Payer: COMMERCIAL

## 2024-09-12 RX ORDER — OXYCODONE HYDROCHLORIDE 5 MG/1
5 TABLET ORAL EVERY 4 HOURS PRN
Status: CANCELLED | OUTPATIENT
Start: 2024-09-12

## 2024-09-13 ENCOUNTER — ANESTHESIA (OUTPATIENT)
Dept: OPERATING ROOM | Facility: HOSPITAL | Age: 29
End: 2024-09-13
Payer: COMMERCIAL

## 2024-09-13 ENCOUNTER — HOSPITAL ENCOUNTER (OUTPATIENT)
Facility: HOSPITAL | Age: 29
Setting detail: OUTPATIENT SURGERY
Discharge: HOME | End: 2024-09-13
Attending: OBSTETRICS & GYNECOLOGY | Admitting: OBSTETRICS & GYNECOLOGY
Payer: COMMERCIAL

## 2024-09-13 VITALS
BODY MASS INDEX: 30.07 KG/M2 | OXYGEN SATURATION: 96 % | TEMPERATURE: 97.3 F | RESPIRATION RATE: 18 BRPM | WEIGHT: 198.41 LBS | DIASTOLIC BLOOD PRESSURE: 54 MMHG | HEART RATE: 106 BPM | SYSTOLIC BLOOD PRESSURE: 109 MMHG | HEIGHT: 68 IN

## 2024-09-13 DIAGNOSIS — Z41.9 SURGERY, ELECTIVE: Primary | ICD-10-CM

## 2024-09-13 DIAGNOSIS — Z30.2 STERILIZATION: ICD-10-CM

## 2024-09-13 DIAGNOSIS — G89.18 POSTOPERATIVE PAIN: ICD-10-CM

## 2024-09-13 LAB — PREGNANCY TEST URINE, POC: NEGATIVE

## 2024-09-13 PROCEDURE — 2500000002 HC RX 250 W HCPCS SELF ADMINISTERED DRUGS (ALT 637 FOR MEDICARE OP, ALT 636 FOR OP/ED): Performed by: OBSTETRICS & GYNECOLOGY

## 2024-09-13 PROCEDURE — 88302 TISSUE EXAM BY PATHOLOGIST: CPT | Performed by: PATHOLOGY

## 2024-09-13 PROCEDURE — 2500000005 HC RX 250 GENERAL PHARMACY W/O HCPCS: Performed by: OBSTETRICS & GYNECOLOGY

## 2024-09-13 PROCEDURE — 2500000004 HC RX 250 GENERAL PHARMACY W/ HCPCS (ALT 636 FOR OP/ED): Performed by: ANESTHESIOLOGY

## 2024-09-13 PROCEDURE — 3600000003 HC OR TIME - INITIAL BASE CHARGE - PROCEDURE LEVEL THREE: Performed by: OBSTETRICS & GYNECOLOGY

## 2024-09-13 PROCEDURE — 2500000004 HC RX 250 GENERAL PHARMACY W/ HCPCS (ALT 636 FOR OP/ED): Performed by: NURSE ANESTHETIST, CERTIFIED REGISTERED

## 2024-09-13 PROCEDURE — 2500000001 HC RX 250 WO HCPCS SELF ADMINISTERED DRUGS (ALT 637 FOR MEDICARE OP): Performed by: OBSTETRICS & GYNECOLOGY

## 2024-09-13 PROCEDURE — 2500000005 HC RX 250 GENERAL PHARMACY W/O HCPCS: Performed by: NURSE ANESTHETIST, CERTIFIED REGISTERED

## 2024-09-13 PROCEDURE — 7100000002 HC RECOVERY ROOM TIME - EACH INCREMENTAL 1 MINUTE: Performed by: OBSTETRICS & GYNECOLOGY

## 2024-09-13 PROCEDURE — 3700000001 HC GENERAL ANESTHESIA TIME - INITIAL BASE CHARGE: Performed by: OBSTETRICS & GYNECOLOGY

## 2024-09-13 PROCEDURE — 2720000007 HC OR 272 NO HCPCS: Performed by: OBSTETRICS & GYNECOLOGY

## 2024-09-13 PROCEDURE — 7100000001 HC RECOVERY ROOM TIME - INITIAL BASE CHARGE: Performed by: OBSTETRICS & GYNECOLOGY

## 2024-09-13 PROCEDURE — 3600000008 HC OR TIME - EACH INCREMENTAL 1 MINUTE - PROCEDURE LEVEL THREE: Performed by: OBSTETRICS & GYNECOLOGY

## 2024-09-13 PROCEDURE — 88302 TISSUE EXAM BY PATHOLOGIST: CPT | Mod: TC,ELYLAB | Performed by: OBSTETRICS & GYNECOLOGY

## 2024-09-13 PROCEDURE — 58661 LAPAROSCOPY REMOVE ADNEXA: CPT | Performed by: OBSTETRICS & GYNECOLOGY

## 2024-09-13 PROCEDURE — 2500000004 HC RX 250 GENERAL PHARMACY W/ HCPCS (ALT 636 FOR OP/ED): Performed by: OBSTETRICS & GYNECOLOGY

## 2024-09-13 PROCEDURE — 7100000010 HC PHASE TWO TIME - EACH INCREMENTAL 1 MINUTE: Performed by: OBSTETRICS & GYNECOLOGY

## 2024-09-13 PROCEDURE — 7100000009 HC PHASE TWO TIME - INITIAL BASE CHARGE: Performed by: OBSTETRICS & GYNECOLOGY

## 2024-09-13 PROCEDURE — 3700000002 HC GENERAL ANESTHESIA TIME - EACH INCREMENTAL 1 MINUTE: Performed by: OBSTETRICS & GYNECOLOGY

## 2024-09-13 PROCEDURE — 81025 URINE PREGNANCY TEST: CPT | Performed by: OBSTETRICS & GYNECOLOGY

## 2024-09-13 RX ORDER — FENTANYL CITRATE 50 UG/ML
50 INJECTION, SOLUTION INTRAMUSCULAR; INTRAVENOUS EVERY 5 MIN PRN
Status: DISCONTINUED | OUTPATIENT
Start: 2024-09-13 | End: 2024-09-13 | Stop reason: HOSPADM

## 2024-09-13 RX ORDER — ROCURONIUM BROMIDE 10 MG/ML
INJECTION, SOLUTION INTRAVENOUS AS NEEDED
Status: DISCONTINUED | OUTPATIENT
Start: 2024-09-13 | End: 2024-09-13

## 2024-09-13 RX ORDER — BUPIVACAINE HCL/EPINEPHRINE 0.25-.0005
VIAL (ML) INJECTION AS NEEDED
Status: DISCONTINUED | OUTPATIENT
Start: 2024-09-13 | End: 2024-09-13 | Stop reason: HOSPADM

## 2024-09-13 RX ORDER — PROPOFOL 10 MG/ML
INJECTION, EMULSION INTRAVENOUS AS NEEDED
Status: DISCONTINUED | OUTPATIENT
Start: 2024-09-13 | End: 2024-09-13

## 2024-09-13 RX ORDER — TRAMADOL HYDROCHLORIDE 50 MG/1
50 TABLET ORAL EVERY 8 HOURS PRN
Qty: 12 TABLET | Refills: 0 | Status: SHIPPED | OUTPATIENT
Start: 2024-09-13 | End: 2024-09-17

## 2024-09-13 RX ORDER — FENTANYL CITRATE 50 UG/ML
INJECTION, SOLUTION INTRAMUSCULAR; INTRAVENOUS AS NEEDED
Status: DISCONTINUED | OUTPATIENT
Start: 2024-09-13 | End: 2024-09-13

## 2024-09-13 RX ORDER — CELECOXIB 200 MG/1
400 CAPSULE ORAL ONCE
Status: COMPLETED | OUTPATIENT
Start: 2024-09-13 | End: 2024-09-13

## 2024-09-13 RX ORDER — SODIUM CHLORIDE, SODIUM LACTATE, POTASSIUM CHLORIDE, CALCIUM CHLORIDE 600; 310; 30; 20 MG/100ML; MG/100ML; MG/100ML; MG/100ML
100 INJECTION, SOLUTION INTRAVENOUS CONTINUOUS
Status: DISCONTINUED | OUTPATIENT
Start: 2024-09-13 | End: 2024-09-13 | Stop reason: HOSPADM

## 2024-09-13 RX ORDER — SODIUM CHLORIDE, SODIUM LACTATE, POTASSIUM CHLORIDE, CALCIUM CHLORIDE 600; 310; 30; 20 MG/100ML; MG/100ML; MG/100ML; MG/100ML
20 INJECTION, SOLUTION INTRAVENOUS CONTINUOUS
Status: DISCONTINUED | OUTPATIENT
Start: 2024-09-13 | End: 2024-09-13 | Stop reason: HOSPADM

## 2024-09-13 RX ORDER — NAPROXEN 500 MG/1
500 TABLET ORAL 2 TIMES DAILY
Qty: 6 TABLET | Refills: 0 | Status: SHIPPED | OUTPATIENT
Start: 2024-09-13 | End: 2024-09-16

## 2024-09-13 RX ORDER — MEPERIDINE HYDROCHLORIDE 25 MG/ML
12.5 INJECTION INTRAMUSCULAR; INTRAVENOUS; SUBCUTANEOUS EVERY 10 MIN PRN
Status: DISCONTINUED | OUTPATIENT
Start: 2024-09-13 | End: 2024-09-13 | Stop reason: HOSPADM

## 2024-09-13 RX ORDER — ACETAMINOPHEN 500 MG
1000 TABLET ORAL EVERY 6 HOURS
Qty: 24 TABLET | Refills: 0 | Status: SHIPPED | OUTPATIENT
Start: 2024-09-13 | End: 2024-09-16

## 2024-09-13 RX ORDER — GABAPENTIN 600 MG/1
600 TABLET ORAL 3 TIMES DAILY
Qty: 9 TABLET | Refills: 0 | Status: SHIPPED | OUTPATIENT
Start: 2024-09-13 | End: 2024-09-16

## 2024-09-13 RX ORDER — GABAPENTIN 300 MG/1
600 CAPSULE ORAL ONCE
Status: COMPLETED | OUTPATIENT
Start: 2024-09-13 | End: 2024-09-13

## 2024-09-13 RX ORDER — ONDANSETRON HYDROCHLORIDE 2 MG/ML
4 INJECTION, SOLUTION INTRAVENOUS ONCE AS NEEDED
Status: DISCONTINUED | OUTPATIENT
Start: 2024-09-13 | End: 2024-09-13 | Stop reason: HOSPADM

## 2024-09-13 RX ORDER — DIPHENHYDRAMINE HYDROCHLORIDE 50 MG/ML
INJECTION INTRAMUSCULAR; INTRAVENOUS AS NEEDED
Status: DISCONTINUED | OUTPATIENT
Start: 2024-09-13 | End: 2024-09-13

## 2024-09-13 RX ORDER — MIDAZOLAM HYDROCHLORIDE 1 MG/ML
INJECTION, SOLUTION INTRAMUSCULAR; INTRAVENOUS AS NEEDED
Status: DISCONTINUED | OUTPATIENT
Start: 2024-09-13 | End: 2024-09-13

## 2024-09-13 RX ORDER — TRANEXAMIC ACID 650 MG/1
1300 TABLET ORAL ONCE
Status: COMPLETED | OUTPATIENT
Start: 2024-09-13 | End: 2024-09-13

## 2024-09-13 RX ORDER — ACETAMINOPHEN 325 MG/1
975 TABLET ORAL ONCE
Status: COMPLETED | OUTPATIENT
Start: 2024-09-13 | End: 2024-09-13

## 2024-09-13 RX ORDER — ONDANSETRON HYDROCHLORIDE 2 MG/ML
INJECTION, SOLUTION INTRAVENOUS AS NEEDED
Status: DISCONTINUED | OUTPATIENT
Start: 2024-09-13 | End: 2024-09-13

## 2024-09-13 RX ORDER — SODIUM CHLORIDE 0.9 G/100ML
IRRIGANT IRRIGATION AS NEEDED
Status: DISCONTINUED | OUTPATIENT
Start: 2024-09-13 | End: 2024-09-13 | Stop reason: HOSPADM

## 2024-09-13 RX ORDER — LIDOCAINE HYDROCHLORIDE 20 MG/ML
INJECTION, SOLUTION INFILTRATION; PERINEURAL AS NEEDED
Status: DISCONTINUED | OUTPATIENT
Start: 2024-09-13 | End: 2024-09-13

## 2024-09-13 RX ORDER — DOCUSATE SODIUM 100 MG/1
100 CAPSULE, LIQUID FILLED ORAL 2 TIMES DAILY
Qty: 60 CAPSULE | Refills: 0 | Status: SHIPPED | OUTPATIENT
Start: 2024-09-13 | End: 2024-10-13

## 2024-09-13 SDOH — HEALTH STABILITY: MENTAL HEALTH: CURRENT SMOKER: 0

## 2024-09-13 ASSESSMENT — PAIN SCALES - GENERAL
PAINLEVEL_OUTOF10: 4
PAINLEVEL_OUTOF10: 1
PAINLEVEL_OUTOF10: 2
PAINLEVEL_OUTOF10: 4
PAINLEVEL_OUTOF10: 4
PAINLEVEL_OUTOF10: 0 - NO PAIN
PAIN_LEVEL: 6
PAINLEVEL_OUTOF10: 9

## 2024-09-13 ASSESSMENT — PAIN - FUNCTIONAL ASSESSMENT
PAIN_FUNCTIONAL_ASSESSMENT: 0-10

## 2024-09-13 ASSESSMENT — PAIN DESCRIPTION - DESCRIPTORS: DESCRIPTORS: CRAMPING

## 2024-09-13 ASSESSMENT — COLUMBIA-SUICIDE SEVERITY RATING SCALE - C-SSRS
2. HAVE YOU ACTUALLY HAD ANY THOUGHTS OF KILLING YOURSELF?: NO
1. IN THE PAST MONTH, HAVE YOU WISHED YOU WERE DEAD OR WISHED YOU COULD GO TO SLEEP AND NOT WAKE UP?: NO
6. HAVE YOU EVER DONE ANYTHING, STARTED TO DO ANYTHING, OR PREPARED TO DO ANYTHING TO END YOUR LIFE?: NO

## 2024-09-13 NOTE — ANESTHESIA POSTPROCEDURE EVALUATION
Patient: Sienna Cabrera    Procedure Summary       Date: 09/13/24 Room / Location: [facility] / Virtual [facility]    Anesthesia Start: 0730 Anesthesia Stop: 0901    Procedure: Laparoscopy with bilateral salpingectomy (Bilateral) Diagnosis:       Sterilization      (Sterilization [Z30.2])    Surgeons: Nico Baugh DO Responsible Provider: Christiano Szymanski MD    Anesthesia Type: general ASA Status: 2            Anesthesia Type: general    Vitals Value Taken Time   /59 09/13/24 0900   Temp 36 °C (96.8 °F) 09/13/24 0900   Pulse 110 09/13/24 0900   Resp 18 09/13/24 0900   SpO2 98 % 09/13/24 0900       Anesthesia Post Evaluation    Patient location during evaluation: PACU  Patient participation: complete - patient participated  Level of consciousness: awake and alert  Pain score: 6  Pain management: inadequate (pt medicated by PACU RN)  Airway patency: patent  Cardiovascular status: hemodynamically stable  Respiratory status: nonlabored ventilation, spontaneous ventilation and face mask  Hydration status: acceptable  Postoperative Nausea and Vomiting: none        There were no known notable events for this encounter.

## 2024-09-13 NOTE — H&P
History Of Present Illness  Sienna Cabrera is a 28 y.o. female presenting with Sterilization [Z30.2]Pre-op Diagnosis      * Sterilization [Z30.2]     Past Medical History  Past Medical History:   Diagnosis Date    ADHD (attention deficit hyperactivity disorder)     Anemia     Anxiety     ARDS survivor 2020    Covid infection at 12 weeks of pregnancy    Asthma (HHS-HCC)     COVID 2020    Depression     Dysautonomia (Multi)     GERD (gastroesophageal reflux disease)     Heart palpitations     Hypotension     Insomnia     Pap test, as part of routine gynecological examination 04/2022    wnl    PTSD (post-traumatic stress disorder)     Tachycardia        Surgical History  Past Surgical History:   Procedure Laterality Date    MR HEAD ANGIO WO IV CONTRAST  12/08/2021    MR HEAD ANGIO WO IV CONTRAST 12/8/2021 [facility]    MR NECK ANGIO WO IV CONTRAST  12/08/2021    MR NECK ANGIO WO IV CONTRAST 12/8/2021 [facility]    TONSILLECTOMY      with adenoidectomy    WISDOM TOOTH EXTRACTION          Social History  She reports that she has never smoked. She has never used smokeless tobacco. She reports that she does not currently use alcohol. She reports that she does not use drugs.    Family History  Family History   Problem Relation Name Age of Onset    Hyperlipidemia Mother      Other (cardiomegaly) Father      Cardiomyopathy Father      Heart failure Father      Other (mitral valve disorder) Father      Heart attack Father      Sleep apnea Father      Coronary artery disease Father      Other (mechanical aortic valve replacement) Father      Other (Coromary artery stent placement) Father      Other (presence of combination internal cardiac defibrillator ICD and pacemaker) Father      No Known Problems Brother         Allergies  Escitalopram, Lactose, and Metoprolol    ROS:  GYN - see HPI  Remainder of the 12 point review of systems was performed and noncontributory    Physical Exam:  GEN:  A&O, NAD  HEENT:   "head NC/AT, conjunctiva clear, no thyromegaly or goiter  CV:  RRR, no visible JVD  RESP:  symmetric respirations, no audible wheezing  ABD:  soft, NT/ND, no obvious masses  :  normal urethra, no bladder TTP  GYN:  deferred  NEURO:  CN 2-12 grossly intact  DERM:  no hirsutism or acne  PSYCH:  normal affect, non-anxious       Last Recorded Vitals  /75 Comment: right upper arm disposable adult cuff  Pulse 91   Temp 36.1 °C (97 °F) (Temporal)   Resp 16   Ht 1.727 m (5' 8\")   Wt 90 kg (198 lb 6.6 oz)   LMP 08/13/2024 (Exact Date)   SpO2 96%   BMI 30.17 kg/m²        Relevant Results       Assessment/Plan       MI LAPAROSCOPY W/RMVL ADNEXAL STRUCTURES [17221] (Laparoscopy with bilateral salpingectomy)         Nico Baugh, DO    "

## 2024-09-13 NOTE — DISCHARGE INSTRUCTIONS
General Anesthesia Discharge Instructions    About this topic  You may need general anesthesia if you need to be asleep during a procedure. Your doctor will use drugs to block the signals that go from your nerves to your brain. Doctors give general anesthesia during a surgery or procedure to:  Allow you to sleep  Help your body be still  Relax your muscles  Help you to relax and be pain free  Keep you from remembering the surgery  Let the doctor manage your airway, breathing, and blood flow  The doctor or nurse anesthetist gives general anesthesia by a shot into your vein. Sometimes, you may breathe in a gas through a mask placed over your face.  What care is needed at home?  Ask your doctor what you need to do when you go home. Make sure you ask questions if you do not understand what the doctor says.  Your doctor may give you drugs to prevent or treat an upset stomach from the anesthetic. Take them as ordered.  If your throat is sore, suck on ice chips or popsicles to ease throat pain.  Put 2 to 3 pillows under your head and back when you lie down to help you breathe easier.  For the first 24 to 48 hours:  Do not operate heavy or dangerous machinery.  Do not make major decisions or sign important papers. You may not be able to think clearly.  Avoid beer, wine, or mixed drinks.  You are at a higher risk of falling for at least 24 hours after general anesthesia.  Take extra care when you get up.  Do not change positions quickly.  Do not rush when you need to go to the bathroom or to answer the phone.  Ask for help if you feel unsteady when you try to walk.  Wear shoes with non-slip soles and low heels.  What follow-up care is needed?  Your doctor may ask you to come back to the office to check on your progress. Be sure to keep these visits.  If you have stitches that do not dissolve or staples, you will need to have them removed. Your doctor will want to do this in 1 to 2 weeks. If the doctor used skin glue, the  glue will fall off on its own.  What drugs may be needed?  The doctor may order drugs to:  Help with pain  Treat an upset stomach or throwing up  Will physical activity be limited?  You will not be allowed to drive right away after the procedure. Ask a family member or a friend to drive you home.  Avoid trying to get out of bed without help until you are sure of your balance.  You may have to limit your activity. Talk to your doctor about if you need to limit how much you lift or limit exercise after your procedure.  What changes to diet are needed?  Start with a light diet when you are fully awake. This includes things that are easy to swallow like soups, pudding, jello, toast, and eggs. Slowly progress to your normal diet.  What problems could happen?  Low blood pressure  Breathing problems  Upset stomach or throwing up  Dizziness  Blood clots  Infection  When do I need to call the doctor?  Trouble breathing  Upset stomach or throwing up more than 3 times in the next 2 days  Dizziness  Teach Back: Helping You Understand  The Teach Back Method helps you understand the information we are giving you. After you talk with the staff, tell them in your own words what you learned. This helps to make sure the staff has described each thing clearly. It also helps to explain things that may have been confusing. Before going home, make sure you can do these:  I can tell you about my procedure.  I can tell you if I need to follow up with my doctor.  I can tell you what is good for me to eat and drink the next day.  I can tell you what I would do if I have trouble breathing, an upset stomach, or dizziness.  Where can I learn more?  [employer]  https://www.nigms.nih.gov/education/pages/factsheet_Anesthesia.aspx  NHS Choices  http://www.nhs.uk/conditions/Anaesthetic-general/Pages/Definition.aspx  Last Reviewed Date  2020-04-22

## 2024-09-13 NOTE — OP NOTE
Date: 24    OR Location: [facility]    Name:  Sienna Cabrera  : 1995 Age: 28 y.o. MRN: [id number] Sex: female     Diagnosis  Pre-op Diagnosis  Post-op Diagnosis     * Sterilization [Z30.2] Post-op Diagnosis  Post-op Diagnosis     * Sterilization [Z30.2]     Procedures  Laparoscopy with bilateral salpingectomy (97611)    Surgeons      * Nico Baugh - Primary    Resident/Fellow/Other Assistant:    Procedure Summary  Anesthesia: General  ASA: I  Anesthesia Staff: Christiano Szymanski MD      Estimated Blood Loss: < 10 mL    Findings: Retroverted uterus with mild adenomyosis changes, normal bilateral fallopian tubes and ovaries, appendix not visualized    Specimens:  bilateral fallopian tubes    Procedure:    CLINICAL HISTORY:  Patient presents with undesired fertility.  Discussed management options including conservative therapy including LARCs and all other birth control.  Patient desires surgical management.  Risks, benefits, alternatives discussed with the patient.  Risks reviewed--including bleeding (requiring transfusion and transfusion reaction), infection (superficial or deep spaces), damage to other tissues (ureters, bladder, vessels, abdominal wall, or bowel), and potential of all risks of requiring further surgery and/or prolonged hospitalization.  Regret from the sterilization as well as increased ectopic pregnancy risk if failure discussed.  Patient desired to proceed and declined all alternate birth control.    PROCEDURE:  Patient was taken the operating room after informed consent was obtained to place in supine position.  General anesthesia was started without complication.  She was placed in lithotomy position in yellowfin stirrups and exam under anesthesia was performed.  She was prepped and draped in normal sterile fashion using Betadine and ChloraPrep.  A timeout was performed.  A speculum was placed into the vagina and the anterior lip of the cervix was grasped with a single-tooth tenaculum.   Uterine manipulator was introduced into the uterine cavity.   Attention was paid to the abdomen where a 1.5 cm incision was made in the infraumbilical  fashion after injection of Marcaine.  In an open fashion the fascia was opened up and the peritoneal layer was entered in a blunt manner palpation identified no adhesions.  A single-site gel port was introduced into the abdominal cavity.  The abdomen was insufflated to 15 mm of carbon dioxide.  The abdomen was surveyed.  Bilateral fallopian tubes, ovaries, anterior cul-de-sac, and posterior cul-de-sac were sequentially visualized.   Fimbriae were tented up, sealed and divided away from the ovary the broad ligament and the uterus.  This was performed in a bilateral manner and the tubes were then removed from the peritoneal cavity.  The diaphragm was irrigated with a dilute bicarb and lidocaine solution.  The fascia was closed at the umbilicus.  Fat was irrigated and then the skin was closed using a subcuticular stitch and then is was covered with skin glue.  Instrumentation was removed from the uterine cavity and vagina.  Patient tolerated the procedure well sponge lap and needle counts are reported as correct  x 2.  She was transferred to PACU in stable condition.      PA served as the first surgical assistant for this surgery as there are no residents at this [facility].  The PA's assistance in the case included assisting with patient positioning, patient prepping and draping, laparoscopic camera management, assisting the surgeon with their laparoscopic instruments, assisting the surgeon with wound fascial closure, and with performing closure of the laparoscopic port sites.      Nico Baugh DO

## 2024-09-13 NOTE — ANESTHESIA PROCEDURE NOTES
Airway  Date/Time: 9/13/2024 7:43 AM  Urgency: elective    Airway not difficult    Staffing  Performed: CRNA   Authorized by: Christiano Szymanski MD    Performed by: DANNY Peralta-VIOLETA  Patient location during procedure: OR    Indications and Patient Condition  Indications for airway management: anesthesia  Spontaneous Ventilation: absent  Sedation level: deep  Preoxygenated: yes  Patient position: sniffing  MILS maintained throughout  Mask difficulty assessment: 2 - vent by mask + OA or adjuvant +/- NMBA  Planned trial extubation    Final Airway Details  Final airway type: endotracheal airway      Successful airway: ETT  Cuffed: yes   Successful intubation technique: direct laryngoscopy  Facilitating devices/methods: intubating stylet  Endotracheal tube insertion site: oral  Blade: Reginald  Blade size: #3  ETT size (mm): 7.0  Cormack-Lehane Classification: grade I - full view of glottis  Placement verified by: chest auscultation and capnometry   Cuff volume (mL): 5  Measured from: lips  ETT to lips (cm): 22  Number of attempts at approach: 2  Ventilation between attempts: none  Number of other approaches attempted: 0    Additional Comments  1st attempt at DL per TEGAN Reyes unsuccessful, 2nd attempt per CRNA successful

## 2024-09-13 NOTE — ANESTHESIA PREPROCEDURE EVALUATION
"Sienna Cabrera is a 28 y.o. female here for:    Laparoscopy with bilateral salpingectomy  With Nico Baugh DO  Pre-Op Diagnosis Codes:      * Encounter for sterilization [Z30.2]    Relevant Problems   Cardiac   (+) Inappropriate sinus tachycardia (CMS-HCC)      Pulmonary   (+) Mild persistent asthma without complication (HHS-HCC)      Neuro   (+) Anxiety and depression      GI   (+) Gastroesophageal reflux disease      Endocrine   (+) Obesity      Hematology   (+) Anemia       Lab Results   Component Value Date    HGB 14.8 09/11/2024    HCT 44.9 09/11/2024    WBC 7.1 09/11/2024     09/11/2024     09/11/2024    K 4.1 09/11/2024     09/11/2024    CREATININE 0.72 09/11/2024    BUN 10 09/11/2024       Social History     Tobacco Use   Smoking Status Never   Smokeless Tobacco Never       Allergies   Allergen Reactions   • Escitalopram Other     \"Mental Status Change   • Lactose GI Upset   • Metoprolol Other     Sleepy/Fatigue       Current Outpatient Medications   Medication Instructions   • buPROPion XL (WELLBUTRIN XL) 150 mg, oral, Every morning, Do not crush, chew, or split.   • cholecalciferol (VITAMIN D-3) 50 mcg, oral, 2 times daily   • Corlanor 5 mg, oral, 2 times daily   • levalbuterol (Xopenex) 45 mcg/actuation inhaler 2 puffs, inhalation, Every 6 hours PRN   • methylphenidate ER 36 mg, oral, Every morning, Do not crush, chew, or split.   • omeprazole (PRILOSEC) 20 mg, oral, Daily   • venlafaxine XR (EFFEXOR-XR) 150 mg, oral, 2 times daily, Take with food.       Past Surgical History:   Procedure Laterality Date   • MR HEAD ANGIO WO IV CONTRAST  12/08/2021    MR HEAD ANGIO WO IV CONTRAST 12/8/2021 [facility]   • MR NECK ANGIO WO IV CONTRAST  12/08/2021    MR NECK ANGIO WO IV CONTRAST 12/8/2021 [facility]   • TONSILLECTOMY      with adenoidectomy   • WISDOM TOOTH EXTRACTION         Family History   Problem Relation Name Age of Onset   • Hyperlipidemia Mother     • Other " (cardiomegaly) Father     • Cardiomyopathy Father     • Heart failure Father     • Other (mitral valve disorder) Father     • Heart attack Father     • Sleep apnea Father     • Coronary artery disease Father     • Other (mechanical aortic valve replacement) Father     • Other (Coromary artery stent placement) Father     • Other (presence of combination internal cardiac defibrillator ICD and pacemaker) Father     • No Known Problems Brother         NPO Details:  NPO/Void Status  Carbohydrate Drink Given Prior to Surgery? : N  Date of Last Liquid: 09/13/24  Time of Last Liquid: 0000  Date of Last Solid: 09/12/24  Time of Last Solid: 2200  Last Intake Type: Clear fluids  Time of Last Void: 0540        Physical Exam    Airway  Mallampati: II  TM distance: >3 FB  Neck ROM: full     Cardiovascular    Dental    Pulmonary    Abdominal        Anesthesia Plan    History of general anesthesia?: yes  History of complications of general anesthesia?: no    ASA 2     general     The patient is not a current smoker.    intravenous induction   Anesthetic plan and risks discussed with patient.    Plan discussed with CRNA.

## 2024-09-13 NOTE — PERIOPERATIVE NURSING NOTE
Pt reports 4/10 pain level after being medicated per MAR, pt spo2 dropped to 84% on room air states that the pain has not changed but states that she is okay with 4/10 because it is cramping.

## 2024-09-14 PROCEDURE — RXMED WILLOW AMBULATORY MEDICATION CHARGE

## 2024-09-17 ENCOUNTER — PHARMACY VISIT (OUTPATIENT)
Dept: PHARMACY | Facility: CLINIC | Age: 29
End: 2024-09-17
Payer: COMMERCIAL

## 2024-09-24 LAB
LABORATORY COMMENT REPORT: NORMAL
PATH REPORT.FINAL DX SPEC: NORMAL
PATH REPORT.GROSS SPEC: NORMAL
PATH REPORT.RELEVANT HX SPEC: NORMAL
PATH REPORT.TOTAL CANCER: NORMAL

## 2024-09-30 ENCOUNTER — APPOINTMENT (OUTPATIENT)
Dept: OBSTETRICS AND GYNECOLOGY | Facility: CLINIC | Age: 29
End: 2024-09-30
Payer: COMMERCIAL

## 2024-09-30 DIAGNOSIS — N93.9 ABNORMAL UTERINE BLEEDING (AUB): ICD-10-CM

## 2024-09-30 DIAGNOSIS — Z09 POSTOP CHECK: Primary | ICD-10-CM

## 2024-09-30 PROCEDURE — 99024 POSTOP FOLLOW-UP VISIT: CPT | Performed by: OBSTETRICS & GYNECOLOGY

## 2024-09-30 RX ORDER — MEDROXYPROGESTERONE ACETATE 10 MG/1
10 TABLET ORAL DAILY
Qty: 14 TABLET | Refills: 0 | Status: SHIPPED | OUTPATIENT
Start: 2024-09-30 | End: 2025-09-30

## 2024-09-30 NOTE — PROGRESS NOTES
GYNECOLOGY VIRTUAL VISIT PROGRESS NOTE        Chief Complaint   Patient presents with    Postop Visit         HPI:  Sienna Cabrera is scheduled today for virtual postop check.      Audio and Visual communication real-time were utilized and verbal consent was obtained for the encounter.    Did well postop, no issues.  Pain controlled, no fevers, normal voids and BMs.  No complaints.  No regrets about sterilization.    LMP=9/13, still spotting and heavier today, has never done this before with her periods.  Was not on hormonal BC before surgery.  No pregnancy signs/symptoms.      ROS:  GYN - see HPI      PHYSICAL EXAM:  VS:  n/a (virtual visit)  GEN:  A&O, NAD  INCISION:  port site healing well, no separation or erythema or discharge from wound  PSYCH:  normal affect, non-anxious      IMPRESSION/PLAN:    Problem List Items Addressed This Visit    None  Visit Diagnoses       Postop check    -  Primary          Postop check:  Doing well postoperatively, released from postoperative care.      Intraoperative events and findings reviewed with patient.  Results of pathology--benign--reviewed with the patient.  Surgical photos labelled and mailed to patient.    AUB:   Postop.  Likely normal postop stress response.   No concern for pregnancy. Rx for Provera for 2 weeks.  If AUB persists will order pelvics US.      F/U PRN, or when next next preventative GYN exam due.      Nico Baugh DO

## 2024-10-02 ENCOUNTER — DOCUMENTATION (OUTPATIENT)
Dept: PRIMARY CARE | Facility: CLINIC | Age: 29
End: 2024-10-02
Payer: COMMERCIAL

## 2024-10-02 NOTE — PROGRESS NOTES
Writer has outreached pt in an attempt to reengage in Collaborative Care. Due to a lack of response to outreach, pt is being closed from Collaborative Care this time. If additional support needs arise, pt can be referred back to programming.     Patient last engaged in Collaborative Care sessions on  08/19/2024  Patient cancelled/no showed last appointment on  09/12/2024

## 2024-10-10 PROCEDURE — RXMED WILLOW AMBULATORY MEDICATION CHARGE

## 2024-10-12 ENCOUNTER — PHARMACY VISIT (OUTPATIENT)
Dept: PHARMACY | Facility: CLINIC | Age: 29
End: 2024-10-12
Payer: COMMERCIAL

## 2024-10-12 PROCEDURE — RXOTC WILLOW AMBULATORY OTC CHARGE

## 2024-10-13 ENCOUNTER — PATIENT MESSAGE (OUTPATIENT)
Dept: PRIMARY CARE | Facility: CLINIC | Age: 29
End: 2024-10-13
Payer: COMMERCIAL

## 2024-10-13 DIAGNOSIS — F90.0 ATTENTION DEFICIT HYPERACTIVITY DISORDER (ADHD), PREDOMINANTLY INATTENTIVE TYPE: ICD-10-CM

## 2024-10-14 RX ORDER — METHYLPHENIDATE HYDROCHLORIDE 36 MG/1
36 TABLET ORAL EVERY MORNING
Qty: 30 TABLET | Refills: 0 | Status: SHIPPED | OUTPATIENT
Start: 2024-10-14 | End: 2024-11-13

## 2024-10-29 ENCOUNTER — APPOINTMENT (OUTPATIENT)
Dept: PRIMARY CARE | Facility: CLINIC | Age: 29
End: 2024-10-29
Payer: COMMERCIAL

## 2024-11-01 ENCOUNTER — PHARMACY VISIT (OUTPATIENT)
Dept: PHARMACY | Facility: CLINIC | Age: 29
End: 2024-11-01
Payer: COMMERCIAL

## 2024-11-01 ENCOUNTER — TELEMEDICINE (OUTPATIENT)
Dept: PRIMARY CARE | Facility: CLINIC | Age: 29
End: 2024-11-01
Payer: COMMERCIAL

## 2024-11-01 DIAGNOSIS — R39.9 UTI SYMPTOMS: Primary | ICD-10-CM

## 2024-11-01 PROCEDURE — RXMED WILLOW AMBULATORY MEDICATION CHARGE

## 2024-11-01 PROCEDURE — 99213 OFFICE O/P EST LOW 20 MIN: CPT | Performed by: FAMILY MEDICINE

## 2024-11-01 RX ORDER — SULFAMETHOXAZOLE AND TRIMETHOPRIM 800; 160 MG/1; MG/1
1 TABLET ORAL 2 TIMES DAILY
Qty: 10 TABLET | Refills: 0 | Status: SHIPPED | OUTPATIENT
Start: 2024-11-01 | End: 2024-11-06

## 2024-11-01 RX ORDER — NITROFURANTOIN 25; 75 MG/1; MG/1
100 CAPSULE ORAL 2 TIMES DAILY
Qty: 10 CAPSULE | Refills: 0 | Status: SHIPPED | OUTPATIENT
Start: 2024-11-01 | End: 2024-11-01 | Stop reason: WASHOUT

## 2024-11-04 ENCOUNTER — APPOINTMENT (OUTPATIENT)
Dept: PRIMARY CARE | Facility: CLINIC | Age: 29
End: 2024-11-04
Payer: COMMERCIAL

## 2024-12-05 ENCOUNTER — APPOINTMENT (OUTPATIENT)
Dept: ORTHOPEDIC SURGERY | Facility: CLINIC | Age: 29
End: 2024-12-05
Payer: COMMERCIAL

## 2024-12-23 ENCOUNTER — PATIENT MESSAGE (OUTPATIENT)
Dept: PRIMARY CARE | Facility: CLINIC | Age: 29
End: 2024-12-23
Payer: COMMERCIAL

## 2024-12-23 DIAGNOSIS — J45.30 MILD PERSISTENT ASTHMA WITHOUT COMPLICATION (HHS-HCC): ICD-10-CM

## 2024-12-23 DIAGNOSIS — Z86.16 PERSONAL HISTORY OF COVID-19: ICD-10-CM

## 2024-12-23 DIAGNOSIS — G90.9 DYSFUNCTIONAL AUTONOMIC NERVOUS SYSTEM: ICD-10-CM

## 2025-01-22 ENCOUNTER — PHARMACY VISIT (OUTPATIENT)
Dept: PHARMACY | Facility: CLINIC | Age: 30
End: 2025-01-22
Payer: COMMERCIAL

## 2025-01-22 PROCEDURE — RXMED WILLOW AMBULATORY MEDICATION CHARGE

## 2025-01-28 DIAGNOSIS — I47.11 INAPPROPRIATE SINUS TACHYCARDIA (CMS-HCC): Primary | ICD-10-CM

## 2025-02-01 ENCOUNTER — APPOINTMENT (OUTPATIENT)
Dept: PRIMARY CARE | Facility: CLINIC | Age: 30
End: 2025-02-01
Payer: COMMERCIAL

## 2025-02-01 ENCOUNTER — HOSPITAL ENCOUNTER (EMERGENCY)
Age: 30
Discharge: HOME | End: 2025-02-01
Attending: STUDENT IN AN ORGANIZED HEALTH CARE EDUCATION/TRAINING PROGRAM

## 2025-02-01 ENCOUNTER — HOSPITAL ENCOUNTER (EMERGENCY)
Facility: HOSPITAL | Age: 30
Discharge: HOME | End: 2025-02-01
Attending: EMERGENCY MEDICINE
Payer: COMMERCIAL

## 2025-02-01 VITALS
BODY MASS INDEX: 28.64 KG/M2 | TEMPERATURE: 97.9 F | DIASTOLIC BLOOD PRESSURE: 70 MMHG | SYSTOLIC BLOOD PRESSURE: 126 MMHG | HEART RATE: 101 BPM | WEIGHT: 189 LBS | RESPIRATION RATE: 15 BRPM | OXYGEN SATURATION: 100 % | HEIGHT: 68 IN

## 2025-02-01 DIAGNOSIS — J11.1 FLU: Primary | ICD-10-CM

## 2025-02-01 LAB
ALBUMIN SERPL BCP-MCNC: 4.5 G/DL (ref 3.4–5)
ALP SERPL-CCNC: 95 U/L (ref 33–110)
ALT SERPL W P-5'-P-CCNC: 12 U/L (ref 7–45)
ANION GAP SERPL CALC-SCNC: 12 MMOL/L (ref 10–20)
AST SERPL W P-5'-P-CCNC: 13 U/L (ref 9–39)
B-HCG SERPL-ACNC: <2 MIU/ML
BASOPHILS # BLD AUTO: 0.02 X10*3/UL (ref 0–0.1)
BASOPHILS NFR BLD AUTO: 0.3 %
BILIRUB SERPL-MCNC: 0.7 MG/DL (ref 0–1.2)
BUN SERPL-MCNC: 18 MG/DL (ref 6–23)
CALCIUM SERPL-MCNC: 8.9 MG/DL (ref 8.6–10.3)
CHLORIDE SERPL-SCNC: 104 MMOL/L (ref 98–107)
CO2 SERPL-SCNC: 25 MMOL/L (ref 21–32)
CREAT SERPL-MCNC: 0.61 MG/DL (ref 0.5–1.05)
EGFRCR SERPLBLD CKD-EPI 2021: >90 ML/MIN/1.73M*2
EOSINOPHIL # BLD AUTO: 0.17 X10*3/UL (ref 0–0.7)
EOSINOPHIL NFR BLD AUTO: 2.9 %
ERYTHROCYTE [DISTWIDTH] IN BLOOD BY AUTOMATED COUNT: 13.1 % (ref 11.5–14.5)
FLUAV RNA RESP QL NAA+PROBE: NOT DETECTED
FLUBV RNA RESP QL NAA+PROBE: NOT DETECTED
GLUCOSE SERPL-MCNC: 105 MG/DL (ref 74–99)
HCT VFR BLD AUTO: 42.3 % (ref 36–46)
HGB BLD-MCNC: 13.6 G/DL (ref 12–16)
IMM GRANULOCYTES # BLD AUTO: 0.02 X10*3/UL (ref 0–0.7)
IMM GRANULOCYTES NFR BLD AUTO: 0.3 % (ref 0–0.9)
LYMPHOCYTES # BLD AUTO: 0.62 X10*3/UL (ref 1.2–4.8)
LYMPHOCYTES NFR BLD AUTO: 10.5 %
MAGNESIUM SERPL-MCNC: 1.74 MG/DL (ref 1.6–2.4)
MCH RBC QN AUTO: 27.4 PG (ref 26–34)
MCHC RBC AUTO-ENTMCNC: 32.2 G/DL (ref 32–36)
MCV RBC AUTO: 85 FL (ref 80–100)
MONOCYTES # BLD AUTO: 0.34 X10*3/UL (ref 0.1–1)
MONOCYTES NFR BLD AUTO: 5.8 %
NEUTROPHILS # BLD AUTO: 4.71 X10*3/UL (ref 1.2–7.7)
NEUTROPHILS NFR BLD AUTO: 80.2 %
NRBC BLD-RTO: 0 /100 WBCS (ref 0–0)
PLATELET # BLD AUTO: 216 X10*3/UL (ref 150–450)
POTASSIUM SERPL-SCNC: 3.6 MMOL/L (ref 3.5–5.3)
PROT SERPL-MCNC: 6.9 G/DL (ref 6.4–8.2)
RBC # BLD AUTO: 4.97 X10*6/UL (ref 4–5.2)
SARS-COV-2 RNA RESP QL NAA+PROBE: NOT DETECTED
SODIUM SERPL-SCNC: 137 MMOL/L (ref 136–145)
WBC # BLD AUTO: 5.9 X10*3/UL (ref 4.4–11.3)

## 2025-02-01 PROCEDURE — 84702 CHORIONIC GONADOTROPIN TEST: CPT | Performed by: EMERGENCY MEDICINE

## 2025-02-01 PROCEDURE — 96361 HYDRATE IV INFUSION ADD-ON: CPT

## 2025-02-01 PROCEDURE — 2500000004 HC RX 250 GENERAL PHARMACY W/ HCPCS (ALT 636 FOR OP/ED)

## 2025-02-01 PROCEDURE — 99284 EMERGENCY DEPT VISIT MOD MDM: CPT | Performed by: EMERGENCY MEDICINE

## 2025-02-01 PROCEDURE — 2500000004 HC RX 250 GENERAL PHARMACY W/ HCPCS (ALT 636 FOR OP/ED): Performed by: EMERGENCY MEDICINE

## 2025-02-01 PROCEDURE — 83735 ASSAY OF MAGNESIUM: CPT

## 2025-02-01 PROCEDURE — 96374 THER/PROPH/DIAG INJ IV PUSH: CPT

## 2025-02-01 PROCEDURE — 85025 COMPLETE CBC W/AUTO DIFF WBC: CPT | Performed by: EMERGENCY MEDICINE

## 2025-02-01 PROCEDURE — 80053 COMPREHEN METABOLIC PANEL: CPT | Performed by: EMERGENCY MEDICINE

## 2025-02-01 PROCEDURE — 36415 COLL VENOUS BLD VENIPUNCTURE: CPT | Performed by: EMERGENCY MEDICINE

## 2025-02-01 PROCEDURE — 87636 SARSCOV2 & INF A&B AMP PRB: CPT | Performed by: EMERGENCY MEDICINE

## 2025-02-01 PROCEDURE — 99284 EMERGENCY DEPT VISIT MOD MDM: CPT | Mod: 25 | Performed by: EMERGENCY MEDICINE

## 2025-02-01 PROCEDURE — 87506 IADNA-DNA/RNA PROBE TQ 6-11: CPT | Mod: STJLAB

## 2025-02-01 RX ORDER — ONDANSETRON 4 MG/1
4 TABLET, ORALLY DISINTEGRATING ORAL EVERY 8 HOURS PRN
Qty: 9 TABLET | Refills: 0 | Status: SHIPPED | OUTPATIENT
Start: 2025-02-01 | End: 2025-02-01

## 2025-02-01 RX ORDER — KETOROLAC TROMETHAMINE 15 MG/ML
15 INJECTION, SOLUTION INTRAMUSCULAR; INTRAVENOUS ONCE
Status: COMPLETED | OUTPATIENT
Start: 2025-02-01 | End: 2025-02-01

## 2025-02-01 RX ORDER — ONDANSETRON 4 MG/1
4 TABLET, ORALLY DISINTEGRATING ORAL EVERY 8 HOURS PRN
Qty: 9 TABLET | Refills: 0 | Status: SHIPPED | OUTPATIENT
Start: 2025-02-01 | End: 2025-02-04

## 2025-02-01 RX ORDER — ONDANSETRON 4 MG/1
4 TABLET, ORALLY DISINTEGRATING ORAL ONCE
Status: COMPLETED | OUTPATIENT
Start: 2025-02-01 | End: 2025-02-01

## 2025-02-01 RX ADMIN — KETOROLAC TROMETHAMINE 15 MG: 15 INJECTION, SOLUTION INTRAMUSCULAR; INTRAVENOUS at 11:08

## 2025-02-01 RX ADMIN — ONDANSETRON 4 MG: 4 TABLET, ORALLY DISINTEGRATING ORAL at 12:21

## 2025-02-01 RX ADMIN — SODIUM CHLORIDE 1000 ML: 9 INJECTION, SOLUTION INTRAVENOUS at 10:52

## 2025-02-01 ASSESSMENT — PAIN SCALES - GENERAL
PAINLEVEL_OUTOF10: 7
PAINLEVEL_OUTOF10: 3

## 2025-02-01 ASSESSMENT — LIFESTYLE VARIABLES
HAVE PEOPLE ANNOYED YOU BY CRITICIZING YOUR DRINKING: NO
EVER FELT BAD OR GUILTY ABOUT YOUR DRINKING: NO
HAVE YOU EVER FELT YOU SHOULD CUT DOWN ON YOUR DRINKING: NO
EVER HAD A DRINK FIRST THING IN THE MORNING TO STEADY YOUR NERVES TO GET RID OF A HANGOVER: NO
TOTAL SCORE: 0

## 2025-02-01 ASSESSMENT — PAIN - FUNCTIONAL ASSESSMENT: PAIN_FUNCTIONAL_ASSESSMENT: 0-10

## 2025-02-01 NOTE — ED PROVIDER NOTES
EMERGENCY DEPARTMENT ENCOUNTER      Pt Name: Sienna Cabrera  MRN: [id number]  Birthdate 1995  Date of evaluation: 2/1/2025  Provider: Chris Luna MD    CHIEF COMPLAINT       Chief Complaint   Patient presents with    Flu Symptoms     Pt with nausea, vomiting, diarrhea since midnight. Pt also complains of joint pain.          HISTORY OF PRESENT ILLNESS    29-year-old female with PMHx of anxiety, ADHD, dysautonomia.  She presents to [facility] ED with vomiting.  She states her symptoms started yesterday after she back from her eye the eye doctor.  Start with vomiting and diarrhea associated with chills.  Patient endorsed generalized joint pain and fatigability.  She denied fever, dizziness, chest pain, shortness of breath, abdominal pain, burning urination or change in color of the urine.  Patient reports she has close contact with person has flu A.  She works as a respiratory therapist.          Nursing Notes were reviewed.    PAST MEDICAL HISTORY     Past Medical History:   Diagnosis Date    ADHD (attention deficit hyperactivity disorder)     Anemia     Anxiety     ARDS survivor 2020    Covid infection at 12 weeks of pregnancy    Asthma (HHS-HCC)     COVID 2020    Depression     Dysautonomia (Multi)     GERD (gastroesophageal reflux disease)     Heart palpitations     Hypotension     Insomnia     Pap test, as part of routine gynecological examination 04/2022    wnl    PTSD (post-traumatic stress disorder)     Tachycardia          SURGICAL HISTORY       Past Surgical History:   Procedure Laterality Date    MR HEAD ANGIO WO IV CONTRAST  12/08/2021    MR HEAD ANGIO WO IV CONTRAST 12/8/2021 [facility]    MR NECK ANGIO WO IV CONTRAST  12/08/2021    MR NECK ANGIO WO IV CONTRAST 12/8/2021 [facility]    TONSILLECTOMY      with adenoidectomy    WISDOM TOOTH EXTRACTION           CURRENT MEDICATIONS       Previous Medications    BUPROPION XL (WELLBUTRIN XL) 150 MG 24 HR TABLET    Take 1 tablet (150 mg)  by mouth once daily in the morning. Do not crush, chew, or split.    CHOLECALCIFEROL (VITAMIN D-3) 25 MCG (1000 UT) CAPSULE    Take 2 capsules (50 mcg) by mouth 2 times a day.    GABAPENTIN (NEURONTIN) 600 MG TABLET    Take 1 tablet (600 mg) by mouth 3 times a day for 3 days.    IVABRADINE (CORLANOR) 5 MG TABLET    Take 1 tablet (5 mg) by mouth 2 times a day.    MEDROXYPROGESTERONE (PROVERA) 10 MG TABLET    Take 1 tablet (10 mg) by mouth once daily.    METHYLPHENIDATE ER 36 MG EXTENDED RELEASE TABLET    Take 1 tablet (36 mg) by mouth once daily in the morning. Do not crush, chew, or split.    OMEPRAZOLE (PRILOSEC) 20 MG DR CAPSULE    Take 1 capsule (20 mg) by mouth once daily.    VENLAFAXINE XR (EFFEXOR-XR) 150 MG 24 HR CAPSULE    Take 1 capsule (150 mg) by mouth 2 times a day. Take with food.       ALLERGIES     Escitalopram, Lactose, and Metoprolol    FAMILY HISTORY       Family History   Problem Relation Name Age of Onset    Hyperlipidemia Mother      Other (cardiomegaly) Father      Cardiomyopathy Father      Heart failure Father      Other (mitral valve disorder) Father      Heart attack Father      Sleep apnea Father      Coronary artery disease Father      Other (mechanical aortic valve replacement) Father      Other (Coromary artery stent placement) Father      Other (presence of combination internal cardiac defibrillator ICD and pacemaker) Father      No Known Problems Brother            SOCIAL HISTORY       Social History     Socioeconomic History    Marital status:    Occupational History    Occupation: RESPIRATORY THERAPIST     Employer: [employer]   Tobacco Use    Smoking status: Never    Smokeless tobacco: Never   Vaping Use    Vaping status: Never Used   Substance and Sexual Activity    Alcohol use: Not Currently     Comment: Social    Drug use: Never    Sexual activity: Defer     Comment: LMP  8/13/24   Social History Narrative    ** Merged History Encounter **             SCREENINGS                        PHYSICAL EXAM    (up to 7 for level 4, 8 or more for level 5)     ED Triage Vitals [02/01/25 0801]   Temperature Heart Rate Respirations BP   36.6 °C (97.9 °F) (!) 129 15 126/67      Pulse Ox Temp Source Heart Rate Source Patient Position   100 % Temporal Monitor Sitting      BP Location FiO2 (%)     Right arm --       Physical Exam  Constitutional:       Appearance: Normal appearance.   HENT:      Head: Normocephalic and atraumatic.      Mouth/Throat:      Mouth: Mucous membranes are moist.   Cardiovascular:      Rate and Rhythm: Normal rate.      Heart sounds: No murmur heard.     No gallop.   Pulmonary:      Effort: No respiratory distress.      Breath sounds: No wheezing or rales.   Abdominal:      General: There is no distension.      Palpations: Abdomen is soft.      Tenderness: There is no abdominal tenderness. There is no guarding.   Musculoskeletal:      Right lower leg: No edema.      Left lower leg: No edema.   Skin:     General: Skin is warm.   Neurological:      Mental Status: She is alert and oriented to person, place, and time.      Cranial Nerves: No cranial nerve deficit.      Motor: No weakness.      Gait: Gait normal.   Psychiatric:         Mood and Affect: Mood normal.          DIAGNOSTIC RESULTS     LABS:  Labs Reviewed   CBC WITH AUTO DIFFERENTIAL - Abnormal       Result Value    WBC 5.9      nRBC 0.0      RBC 4.97      Hemoglobin 13.6      Hematocrit 42.3      MCV 85      MCH 27.4      MCHC 32.2      RDW 13.1      Platelets 216      Neutrophils % 80.2      Immature Granulocytes %, Automated 0.3      Lymphocytes % 10.5      Monocytes % 5.8      Eosinophils % 2.9      Basophils % 0.3      Neutrophils Absolute 4.71      Immature Granulocytes Absolute, Automated 0.02      Lymphocytes Absolute 0.62 (*)     Monocytes Absolute 0.34      Eosinophils Absolute 0.17      Basophils Absolute 0.02     COMPREHENSIVE METABOLIC PANEL - Abnormal    Glucose 105 (*)      Sodium 137      Potassium 3.6      Chloride 104      Bicarbonate 25      Anion Gap 12      Urea Nitrogen 18      Creatinine 0.61      eGFR >90      Calcium 8.9      Albumin 4.5      Alkaline Phosphatase 95      Total Protein 6.9      AST 13      Bilirubin, Total 0.7      ALT 12     SARS-COV-2 AND INFLUENZA A/B PCR - Normal    Flu A Result Not Detected      Flu B Result Not Detected      Coronavirus 2019, PCR Not Detected      Narrative:     This assay is an FDA-cleared, in vitro diagnostic nucleic acid amplification test for the qualitative detection and differentiation of SARS CoV-2/ Influenza A/B from nasopharyngeal specimens collected from individuals with signs and symptoms of respiratory tract infections, and has been validated for use at [facility]. Negative results do not preclude COVID-19/ Influenza A/B infections and should not be used as the sole basis for diagnosis, treatment, or other management decisions. Testing for SARS CoV-2 is recommended only for patients who meet current clinical and/or epidemiological criteria defined by federal, state, or local public health directives.   HUMAN CHORIONIC GONADOTROPIN, SERUM QUANTITATIVE       All other labs were within normal range or not returned as of this dictation.    Imaging  No orders to display        Procedures  Please see separate procedure documentation for further details.     EMERGENCY DEPARTMENT COURSE/MDM:   Medical Decision Making  29-year-old female presents with vomiting and diarrhea, she has concern of Mohamud given her recent contact with flu a patient.  CBC and CMP unremarkable.  Magnesium within normal range.  Beta-hCG negative.  Flu A/B and coronavirus were detected.  Patient received Toradol, Zofran and 1 L of normal saline.  Lab results was discussed with the patient.  She understand and agree with the management plan.  She is discharged home in a good condition with instruction and she can come back to  ED.        Please see ED course for additional MDM.    Diagnoses as of 02/01/25 1211   Flu        No orders to display       Patient and or family in agreement and understanding of treatment plan.  All questions answered.      I reviewed the case with the attending ED physician. The attending ED physician agrees with the plan. Patient and/or patient´s representative was counseled regarding labs, imaging, likely diagnosis, and plan. All questions were answered.    ED Medications administered this visit:  Medications - No data to display    New Prescriptions from this visit:    New Prescriptions    No medications on file       Follow-up:  No follow-up provider specified.      Final Impression: No diagnosis found.      (Please note that portions of this note were completed with a voice recognition program.  Efforts were made to edit the dictations but occasionally words are mis-transcribed.)       Chris Luna MD  Resident  02/01/25 1213

## 2025-02-02 LAB
C COLI+JEJ+UPSA DNA STL QL NAA+PROBE: NOT DETECTED
EC STX1 GENE STL QL NAA+PROBE: NOT DETECTED
EC STX2 GENE STL QL NAA+PROBE: NOT DETECTED
NOROVIRUS GI + GII RNA STL NAA+PROBE: DETECTED
RV RNA STL NAA+PROBE: NOT DETECTED
SALMONELLA DNA STL QL NAA+PROBE: NOT DETECTED
SHIGELLA DNA SPEC QL NAA+PROBE: NOT DETECTED
V CHOLERAE DNA STL QL NAA+PROBE: NOT DETECTED
Y ENTEROCOL DNA STL QL NAA+PROBE: NOT DETECTED

## 2025-02-17 ENCOUNTER — APPOINTMENT (OUTPATIENT)
Dept: PRIMARY CARE | Facility: CLINIC | Age: 30
End: 2025-02-17
Payer: COMMERCIAL

## 2025-02-17 VITALS
WEIGHT: 191 LBS | RESPIRATION RATE: 16 BRPM | SYSTOLIC BLOOD PRESSURE: 100 MMHG | HEART RATE: 90 BPM | TEMPERATURE: 97 F | BODY MASS INDEX: 28.95 KG/M2 | HEIGHT: 68 IN | DIASTOLIC BLOOD PRESSURE: 62 MMHG | OXYGEN SATURATION: 98 %

## 2025-02-17 DIAGNOSIS — G83.10 PARESIS OF LOWER EXTREMITY (MULTI): ICD-10-CM

## 2025-02-17 DIAGNOSIS — F90.0 ATTENTION DEFICIT HYPERACTIVITY DISORDER (ADHD), PREDOMINANTLY INATTENTIVE TYPE: ICD-10-CM

## 2025-02-17 DIAGNOSIS — F41.9 ANXIETY AND DEPRESSION: ICD-10-CM

## 2025-02-17 DIAGNOSIS — J45.30 MILD PERSISTENT ASTHMA WITHOUT COMPLICATION (HHS-HCC): ICD-10-CM

## 2025-02-17 DIAGNOSIS — G90.1 DYSAUTONOMIA (MULTI): ICD-10-CM

## 2025-02-17 DIAGNOSIS — G47.01 INSOMNIA DUE TO MEDICAL CONDITION: ICD-10-CM

## 2025-02-17 DIAGNOSIS — J01.00 ACUTE NON-RECURRENT MAXILLARY SINUSITIS: Primary | ICD-10-CM

## 2025-02-17 DIAGNOSIS — F32.A ANXIETY AND DEPRESSION: ICD-10-CM

## 2025-02-17 DIAGNOSIS — Z79.899 MEDICATION MANAGEMENT: ICD-10-CM

## 2025-02-17 DIAGNOSIS — I47.11 INAPPROPRIATE SINUS TACHYCARDIA (CMS-HCC): ICD-10-CM

## 2025-02-17 DIAGNOSIS — F40.243 PHOBIA, FLYING: ICD-10-CM

## 2025-02-17 PROCEDURE — 99214 OFFICE O/P EST MOD 30 MIN: CPT | Performed by: FAMILY MEDICINE

## 2025-02-17 PROCEDURE — 1036F TOBACCO NON-USER: CPT | Performed by: FAMILY MEDICINE

## 2025-02-17 PROCEDURE — 3008F BODY MASS INDEX DOCD: CPT | Performed by: FAMILY MEDICINE

## 2025-02-17 RX ORDER — METHYLPHENIDATE HYDROCHLORIDE 36 MG/1
36 TABLET ORAL EVERY MORNING
Qty: 30 TABLET | Refills: 0 | Status: SHIPPED | OUTPATIENT
Start: 2025-02-17 | End: 2025-03-19

## 2025-02-17 RX ORDER — AMOXICILLIN 875 MG/1
875 TABLET, FILM COATED ORAL 2 TIMES DAILY
Qty: 20 TABLET | Refills: 0 | Status: SHIPPED | OUTPATIENT
Start: 2025-02-17 | End: 2025-02-27

## 2025-02-17 RX ORDER — ALBUTEROL SULFATE 90 UG/1
2 INHALANT RESPIRATORY (INHALATION) EVERY 6 HOURS PRN
Qty: 17 G | Refills: 3 | Status: SHIPPED | OUTPATIENT
Start: 2025-02-17 | End: 2026-02-17

## 2025-02-17 RX ORDER — ALPRAZOLAM 0.25 MG/1
0.25 TABLET ORAL 3 TIMES DAILY PRN
Qty: 10 TABLET | Refills: 0 | Status: SHIPPED | OUTPATIENT
Start: 2025-02-17 | End: 2025-10-15

## 2025-02-17 NOTE — PROGRESS NOTES
Subjective   Patient ID: Sienna Cabrera is a 29 y.o. female who presents for FOLLOW UP VISIT.  Covid vax: UTD per pt  Flu: UTD per pt     Pap: 8/2024  Lmp: 1 week ago  Doing well so far-not many illnesses  Had norovirus lately-recovered  Not overly anxious  Doing well  Had norovirus-healed from it-IVFs  Going to [place] this wk  Anxious-wants prn med for phobia  Has had xanax rba addressed  No hi/si  3 kids-doing well  Happy usually but anxious  Advised to consider evaluation of her meds w new therapist  And let me know    HPI  Patient Active Problem List   Diagnosis    Anemia    Anxiety and depression    Attention deficit hyperactivity disorder (ADHD), predominantly inattentive type    Vitamin B12 deficiency    Dysautonomia (Multi)    Dysfunctional autonomic nervous system    Disorder of brain    Encephalopathy    Gastroesophageal reflux disease    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    Hypotension    Impaired cognition    Abnormal gait    Inappropriate sinus tachycardia (CMS-HCC)    Insomnia due to medical condition    Macrosomia (HHS-HCC)    Memory loss of unknown cause    Mild persistent asthma without complication (HHS-HCC)    Numbness and tingling    Obesity    Overweight with body mass index (BMI) 25.0-29.9    Palpitations    Poor concentration    Post covid-19 condition, unspecified    Shortness of breath    Tendinitis of right rotator cuff    Vitamin D deficiency    Paresis of lower extremity (Multi)    Hypokalemia    Medication course changed    Sterilization       Past Surgical History:   Procedure Laterality Date    MR HEAD ANGIO WO IV CONTRAST  12/08/2021    MR HEAD ANGIO WO IV CONTRAST 12/8/2021 [facility]    MR NECK ANGIO WO IV CONTRAST  12/08/2021    MR NECK ANGIO WO IV CONTRAST 12/8/2021 [facility]    TONSILLECTOMY      with adenoidectomy    WISDOM TOOTH EXTRACTION         Review of Systems  This patient has  NO history of seizures/ CAD or CVA    NO history of  "recent Covid nor flu symptoms,    NO Fever nor chills today,    NO Chest pain, shortness of breath nor paroxysmal nocturnal dyspnea,  NO Nausea, vomiting, nor diarrhea,  NO Hematochezia nor melena,  NO Dysuria, hematuria, nor new incontinence issues  NO new severe headaches nor neurological complaints,  NO new issues with anxiety nor depression nor new psychiatric complaints,  NO suicidal nor homicidal ideations.     OBJECTIVE:  /62   Pulse 90   Temp 36.1 °C (97 °F) (Temporal)   Resp 16   Ht 1.727 m (5' 8\")   Wt 86.6 kg (191 lb)   LMP 02/10/2025 (Approximate)   SpO2 98%   BMI 29.04 kg/m²      General:  alert, oriented, no acute distress.  No obvious skin rashes noted.   No gait disturbance noted/baseline gait.    Mood is pleasant,  no signs of emotional distress.   Not appearing intoxicated or altered.   No voiced delusions,   Normal, appropriate behavior.    HEENT: Normocephalic, atraumatic,   Pupils round, reactive to light  Extraocular motions intact and wnl  Tympanic membranes normal    Neck: no nuchal rigidity  No masses palpable.  No carotid bruits.  No thyromegaly.    Respiratory: Equal breath sounds  No wheezes,    rales,    nor rhonchi  No respiratory distress.    Heart: Regular rate and rhythm, no    murmurs  no rubs/gallops    Abdomen: no masses palpable, nontender, no rebound nor guarding.    Extremities: NO cyanosis noted, no clubbing.   No edema noted.  2+dorsalis pedis pulses.    Normal-not antalgic, steady gait.         Assessment/Plan     Problem List Items Addressed This Visit       Anxiety and depression    Attention deficit hyperactivity disorder (ADHD), predominantly inattentive type    Relevant Medications    methylphenidate ER 36 mg extended release tablet    Other Relevant Orders    Drug Screen, Urine With Reflex to Confirmation    Dysautonomia (Multi)    Inappropriate sinus tachycardia (CMS-HCC)    Insomnia due to medical condition    Mild persistent asthma without complication " ([facility])    Relevant Medications    albuterol 90 mcg/actuation inhaler    Paresis of lower extremity (Multi)     Other Visit Diagnoses       Acute non-recurrent maxillary sinusitis    -  Primary    Relevant Medications    amoxicillin (Amoxil) 875 mg tablet    Medication management        Relevant Orders    Drug Screen, Urine With Reflex to Confirmation    Phobia, flying        Relevant Medications    ALPRAZolam (Xanax) 0.25 mg tablet        Overuse and abuse potential discussed with patient (parents if applicable)  If mood deterioration, status change etc on medicine, suicidal or homicidal ideations -patient agrees to proceed with crisis plan-in place-including-not limited to contacting family, our office and or proceeding to ER.  Reviewed controlled substance agreement - including but not limited to the risks-benefits-alternatives to treatment with a controlled substance medication(s).  It is recommended that [place] reports be checked and patient have appointment at least every 3months(6 for certain medicines only)  If the agreement signed (controlled substance agreement) is violated prescriptions may be stopped abruptly and patient /family understands and agrees to this.  Another reason for termination of agreement is if we have concern for abuse or overuse and it is also recommended that patient take responsibility to try to taper and minimize use of these medicines frequently trying to limit or gradually taper to discontinuation.    Patient is aware that side effects such as insomnia, unexpected weight changes are unexpected and should result in discontinuation.  Always use caution and AVOID operating machinery(driving etc) on pain medicines or CNS depressants and avoid combining together OR with alcohol. If opioids are prescribed patient understands the benefits of narcan and was offered prescription.  Follow up sooner if condition deteriorates or problems arise.  Reviewed controlled substances agreement  including but not limited to the benefits-risks and alternatives to treatment with a controlled substance medication.    Agrees to regular follow and counseling for maximum benefits.  Occ insomnia  Not worse w adhd med    Mood is ok  In counseling [facility]  Uri sxs mild  Going to [place] later this wk  Xanax every day prn no etoh or driving on this  Follow up at next scheduled visit -as planned or directed today.  Sooner if new or unresolved issues of concern.  3mo csm can be virtual

## 2025-02-17 NOTE — PROGRESS NOTES
Subjective   Patient ID: Sienna Cabrera is a 29 y.o. female who presents for FOLLOW UP VISIT.  Covid vax: UTD per pt  Flu: UTD per pt     Pap: 8/2024  Lmp: 1 week ago  Flying soon-wants prn xanax  This medications risks, benefits, and alternatives were discussed with patient at length.  If any unwanted side effects occur-discontinue medicine and call the office for discussion.  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    Hypotension    Impaired cognition    Abnormal gait    Inappropriate sinus tachycardia (CMS-HCC)    Insomnia due to medical condition    Macrosomia (HHS-HCC)    Memory loss of unknown cause    Mild persistent asthma without complication (HHS-HCC)    Numbness and tingling    Obesity    Overweight with body mass index (BMI) 25.0-29.9    Palpitations    Poor concentration    Post covid-19 condition, unspecified    Shortness of breath    Tendinitis of right rotator cuff    Vitamin D deficiency    Paresis of lower extremity (Multi)    Hypokalemia    Medication course changed    Sterilization       Past Surgical History:   Procedure Laterality Date    MR HEAD ANGIO WO IV CONTRAST  12/08/2021    MR HEAD ANGIO WO IV CONTRAST 12/8/2021 [facility]    MR NECK ANGIO WO IV CONTRAST  12/08/2021    MR NECK ANGIO WO IV CONTRAST 12/8/2021 [facility]    TONSILLECTOMY      with adenoidectomy    WISDOM TOOTH EXTRACTION         Review of Systems  This patient has  NO  "history of seizures/ CAD or CVA    NO history of recent Covid nor flu symptoms,    NO Fever nor chills today,    NO Chest pain, shortness of breath nor paroxysmal nocturnal dyspnea,  NO Nausea, vomiting, nor diarrhea,  NO Hematochezia nor melena,  NO Dysuria, hematuria, nor new incontinence issues  NO new severe headaches nor neurological complaints,  NO new issues with anxiety nor depression nor new psychiatric complaints,  NO suicidal nor homicidal ideations.     OBJECTIVE:  /62   Pulse 90   Temp 36.1 °C (97 °F) (Temporal)   Resp 16   Ht 1.727 m (5' 8\")   Wt 86.6 kg (191 lb)   LMP 02/10/2025 (Approximate)   SpO2 98%   BMI 29.04 kg/m²      General:  alert, oriented, no acute distress.  No obvious skin rashes noted.   No gait disturbance noted/baseline gait.    Mood is pleasant,  no signs of emotional distress.   Not appearing intoxicated or altered.   No voiced delusions,   Normal, appropriate behavior.    HEENT: Normocephalic, atraumatic,   Pupils round, reactive to light  Extraocular motions intact and wnl  Tympanic membranes normal    Neck: no nuchal rigidity  No masses palpable.  No carotid bruits.  No thyromegaly.    Respiratory: Equal breath sounds  No wheezes,    rales,    nor rhonchi  No respiratory distress.    Heart: Regular rate and rhythm, no    murmurs  no rubs/gallops    Abdomen: no masses palpable, nontender, no rebound nor guarding.    Extremities: NO cyanosis noted, no clubbing.   No edema noted.  2+dorsalis pedis pulses.    Normal-not antalgic, steady gait.    Admission on 02/01/2025, Discharged on 02/01/2025   Component Date Value Ref Range Status    Flu A Result 02/01/2025 Not Detected  Not Detected Final    Flu B Result 02/01/2025 Not Detected  Not Detected Final    Coronavirus 2019, PCR 02/01/2025 Not Detected  Not Detected Final    WBC 02/01/2025 5.9  4.4 - 11.3 x10*3/uL Final    nRBC 02/01/2025 0.0  0.0 - 0.0 /100 WBCs Final    RBC 02/01/2025 4.97  4.00 - 5.20 x10*6/uL Final    " Hemoglobin 02/01/2025 13.6  12.0 - 16.0 g/dL Final    Hematocrit 02/01/2025 42.3  36.0 - 46.0 % Final    MCV 02/01/2025 85  80 - 100 fL Final    MCH 02/01/2025 27.4  26.0 - 34.0 pg Final    MCHC 02/01/2025 32.2  32.0 - 36.0 g/dL Final    RDW 02/01/2025 13.1  11.5 - 14.5 % Final    Platelets 02/01/2025 216  150 - 450 x10*3/uL Final    Neutrophils % 02/01/2025 80.2  40.0 - 80.0 % Final    Immature Granulocytes %, Automated 02/01/2025 0.3  0.0 - 0.9 % Final    Immature Granulocyte Count (IG) includes promyelocytes, myelocytes and metamyelocytes but does not include bands. Percent differential counts (%) should be interpreted in the context of the absolute cell counts (cells/UL).    Lymphocytes % 02/01/2025 10.5  13.0 - 44.0 % Final    Monocytes % 02/01/2025 5.8  2.0 - 10.0 % Final    Eosinophils % 02/01/2025 2.9  0.0 - 6.0 % Final    Basophils % 02/01/2025 0.3  0.0 - 2.0 % Final    Neutrophils Absolute 02/01/2025 4.71  1.20 - 7.70 x10*3/uL Final    Percent differential counts (%) should be interpreted in the context of the absolute cell counts (cells/uL).    Immature Granulocytes Absolute, Au* 02/01/2025 0.02  0.00 - 0.70 x10*3/uL Final    Lymphocytes Absolute 02/01/2025 0.62 (L)  1.20 - 4.80 x10*3/uL Final    Monocytes Absolute 02/01/2025 0.34  0.10 - 1.00 x10*3/uL Final    Eosinophils Absolute 02/01/2025 0.17  0.00 - 0.70 x10*3/uL Final    Basophils Absolute 02/01/2025 0.02  0.00 - 0.10 x10*3/uL Final    Glucose 02/01/2025 105 (H)  74 - 99 mg/dL Final    Sodium 02/01/2025 137  136 - 145 mmol/L Final    Potassium 02/01/2025 3.6  3.5 - 5.3 mmol/L Final    Chloride 02/01/2025 104  98 - 107 mmol/L Final    Bicarbonate 02/01/2025 25  21 - 32 mmol/L Final    Anion Gap 02/01/2025 12  10 - 20 mmol/L Final    Urea Nitrogen 02/01/2025 18  6 - 23 mg/dL Final    Creatinine 02/01/2025 0.61  0.50 - 1.05 mg/dL Final    eGFR 02/01/2025 >90  >60 mL/min/1.73m*2 Final    Calculations of estimated GFR are performed using the 2021  CKD-EPI Study Refit equation without the race variable for the IDMS-Traceable creatinine methods.  https://jasn.asnjournals.org/content/early/2021/09/22/ASN.1775588226    Calcium 02/01/2025 8.9  8.6 - 10.3 mg/dL Final    Albumin 02/01/2025 4.5  3.4 - 5.0 g/dL Final    Alkaline Phosphatase 02/01/2025 95  33 - 110 U/L Final    Total Protein 02/01/2025 6.9  6.4 - 8.2 g/dL Final    AST 02/01/2025 13  9 - 39 U/L Final    Bilirubin, Total 02/01/2025 0.7  0.0 - 1.2 mg/dL Final    ALT 02/01/2025 12  7 - 45 U/L Final    Patients treated with Sulfasalazine may generate falsely decreased results for ALT.    HCG, Beta-Quantitative 02/01/2025 <2  <5 mIU/mL Final    Campylobacter Group 02/01/2025 Not Detected  Not Detected Final    Salmonella species 02/01/2025 Not Detected  Not Detected Final    Shigella species 02/01/2025 Not Detected  Not Detected Final    Vibrio Group 02/01/2025 Not Detected  Not Detected Final    Yersinia Enterocolitica 02/01/2025 Not Detected  Not Detected Final    Shiga Toxin 1 02/01/2025 Not Detected  Not Detected Final    Shiga Toxin 2 02/01/2025 Not Detected  Not Detected Final    Norovirus GI/GII 02/01/2025 Detected (A)  Not Detected Final    Rotavirus A 02/01/2025 Not Detected  Not Detected Final    Magnesium 02/01/2025 1.74  1.60 - 2.40 mg/dL Final        Assessment/Plan     Problem List Items Addressed This Visit       Anxiety and depression    Attention deficit hyperactivity disorder (ADHD), predominantly inattentive type    Relevant Medications    methylphenidate ER 36 mg extended release tablet    Other Relevant Orders    Drug Screen, Urine With Reflex to Confirmation    Dysautonomia (Multi)    Inappropriate sinus tachycardia (CMS-HCC)    Insomnia due to medical condition    Mild persistent asthma without complication (HHS-HCC)    Relevant Medications    albuterol 90 mcg/actuation inhaler    Paresis of lower extremity (Multi)     Other Visit Diagnoses       Medication management        Relevant  Orders    Drug Screen, Urine With Reflex to Confirmation          Overuse and abuse potential discussed with patient (parents if applicable)  If mood deterioration, status change etc on medicine, suicidal or homicidal ideations -patient agrees to proceed with crisis plan-in place-including-not limited to contacting family, our office and or proceeding to ER.  Reviewed controlled substance agreement - including but not limited to the risks-benefits-alternatives to treatment with a controlled substance medication(s).  It is recommended that [place]AR[place] reports be checked and patient have appointment at least every 3months(6 for certain medicines only)  If the agreement signed (controlled substance agreement) is violated prescriptions may be stopped abruptly and patient /family understands and agrees to this.  Another reason for termination of agreement is if we have concern for abuse or overuse and it is also recommended that patient take responsibility to try to taper and minimize use of these medicines frequently trying to limit or gradually taper to discontinuation.    Patient is aware that side effects such as insomnia, unexpected weight changes are unexpected and should result in discontinuation.  Always use caution and AVOID operating machinery(driving etc) on pain medicines or CNS depressants and avoid combining together OR with alcohol. If opioids are prescribed patient understands the benefits of narcan and was offered prescription.  Follow up sooner if condition deteriorates or problems arise.  Reviewed controlled substances agreement including but not limited to the benefits-risks and alternatives to treatment with a controlled substance medication.    Agrees to regular follow and counseling for maximum benefits.  Seeing new counselor    Follow up at next scheduled visit -as planned or directed today.  Sooner if new or unresolved issues of concern.    3mo csm

## 2025-02-18 LAB
AMPHETAMINES UR QL: NEGATIVE NG/ML
BARBITURATES UR QL: NEGATIVE NG/ML
BENZODIAZ UR QL: NEGATIVE NG/ML
BZE UR QL: NEGATIVE NG/ML
CREAT UR-MCNC: 93.1 MG/DL
METHADONE UR QL: NEGATIVE NG/ML
OPIATES UR QL: NEGATIVE NG/ML
OXIDANTS UR QL: NEGATIVE MCG/ML
OXYCODONE UR QL: NEGATIVE NG/ML
PCP UR QL: NEGATIVE NG/ML
PH UR: 7.7 [PH] (ref 4.5–9)
QUEST NOTES AND COMMENTS: NORMAL
THC UR QL: NEGATIVE NG/ML

## 2025-03-09 DIAGNOSIS — F41.9 ANXIETY AND DEPRESSION: ICD-10-CM

## 2025-03-09 DIAGNOSIS — F90.0 ATTENTION DEFICIT HYPERACTIVITY DISORDER (ADHD), PREDOMINANTLY INATTENTIVE TYPE: ICD-10-CM

## 2025-03-09 DIAGNOSIS — F32.A ANXIETY AND DEPRESSION: ICD-10-CM

## 2025-03-10 RX ORDER — VENLAFAXINE HYDROCHLORIDE 150 MG/1
CAPSULE, EXTENDED RELEASE ORAL
Qty: 180 CAPSULE | Refills: 3 | Status: SHIPPED | OUTPATIENT
Start: 2025-03-10

## 2025-04-01 PROCEDURE — RXMED WILLOW AMBULATORY MEDICATION CHARGE

## 2025-04-03 ENCOUNTER — PHARMACY VISIT (OUTPATIENT)
Dept: PHARMACY | Facility: CLINIC | Age: 30
End: 2025-04-03
Payer: COMMERCIAL

## 2025-04-11 ENCOUNTER — TELEPHONE VISIT (OUTPATIENT)
Dept: PRIMARY CARE | Facility: CLINIC | Age: 30
End: 2025-04-11
Payer: COMMERCIAL

## 2025-04-11 DIAGNOSIS — F43.20 GRIEF REACTION: Primary | ICD-10-CM

## 2025-04-11 DIAGNOSIS — F40.243 PHOBIA, FLYING: ICD-10-CM

## 2025-04-11 RX ORDER — ALPRAZOLAM 0.25 MG/1
0.25 TABLET ORAL 3 TIMES DAILY PRN
Qty: 21 TABLET | Refills: 0 | Status: SHIPPED | OUTPATIENT
Start: 2025-04-11 | End: 2025-12-07

## 2025-04-11 NOTE — PROGRESS NOTES
KENNETH ZELAYA  I am so sorry about your dad :(  Will send small supply of xanax now  Dont drive etc on it or drink alcohol  If you need our counseling here or a visit-or if you are in crisis etc-let us know  This will be an evisit since I am sending medication    The purpose of an E VISIT is a convenient, affordable way to receive a treatment plan for mild to moderate illnesses or problems. An E VISIT is limited in its ability to diagnose and treat and is not as thorough nor helpful as an in person visit and examination. It must be understood that nothing replaces EMERGENCY ROOM CARE /Calling 911 for emergencies or any pain of severe nature-not limited to but certainly including chest, abdominal, or headache pain and any bleeding that cannot be easily stopped, dizziness/ light headedness and many other urgent symptoms NOT mentioned some include symptoms of heart attack or stroke.     We are doing an E VISIT to help YOU as a convenience to get tests ordered or treatments started-saving you time. Especially since multiple messages are sent each day to our office and some medical issues can be streamlined or investigations and/ or treatment started by Email methods. However, many are NOT conducive to this email type communication.  Also-we may initially select an email visit as appropriate but unwanted side effects of medicine or new concerns may arise-in which case -another E VISIT is INAPPROPRIATE and should result in an in-person visit.  Thank you.    Dr jiménez

## 2025-05-05 ENCOUNTER — APPOINTMENT (OUTPATIENT)
Dept: PRIMARY CARE | Facility: CLINIC | Age: 30
End: 2025-05-05
Payer: COMMERCIAL

## 2025-05-05 VITALS
RESPIRATION RATE: 16 BRPM | BODY MASS INDEX: 29.04 KG/M2 | HEART RATE: 84 BPM | TEMPERATURE: 97.6 F | SYSTOLIC BLOOD PRESSURE: 122 MMHG | OXYGEN SATURATION: 97 % | HEIGHT: 68 IN | DIASTOLIC BLOOD PRESSURE: 64 MMHG

## 2025-05-05 DIAGNOSIS — F41.9 ANXIETY AND DEPRESSION: ICD-10-CM

## 2025-05-05 DIAGNOSIS — G90.1 DYSAUTONOMIA (MULTI): ICD-10-CM

## 2025-05-05 DIAGNOSIS — J45.30 MILD PERSISTENT ASTHMA WITHOUT COMPLICATION (HHS-HCC): ICD-10-CM

## 2025-05-05 DIAGNOSIS — U09.9 POST COVID-19 CONDITION, UNSPECIFIED: ICD-10-CM

## 2025-05-05 DIAGNOSIS — F90.0 ATTENTION DEFICIT HYPERACTIVITY DISORDER (ADHD), PREDOMINANTLY INATTENTIVE TYPE: ICD-10-CM

## 2025-05-05 DIAGNOSIS — F32.A ANXIETY AND DEPRESSION: ICD-10-CM

## 2025-05-05 DIAGNOSIS — K21.9 GASTROESOPHAGEAL REFLUX DISEASE, UNSPECIFIED WHETHER ESOPHAGITIS PRESENT: ICD-10-CM

## 2025-05-05 PROCEDURE — 99214 OFFICE O/P EST MOD 30 MIN: CPT | Performed by: FAMILY MEDICINE

## 2025-05-05 PROCEDURE — 1036F TOBACCO NON-USER: CPT | Performed by: FAMILY MEDICINE

## 2025-05-05 RX ORDER — VENLAFAXINE HYDROCHLORIDE 150 MG/1
300 CAPSULE, EXTENDED RELEASE ORAL DAILY
Qty: 180 CAPSULE | Refills: 3 | Status: SHIPPED | OUTPATIENT
Start: 2025-05-05

## 2025-05-05 RX ORDER — OMEPRAZOLE 20 MG/1
20 CAPSULE, DELAYED RELEASE ORAL DAILY
Qty: 90 CAPSULE | Refills: 3 | Status: SHIPPED | OUTPATIENT
Start: 2025-05-05

## 2025-05-05 RX ORDER — METHYLPHENIDATE HYDROCHLORIDE 36 MG/1
36 TABLET ORAL EVERY MORNING
Qty: 30 TABLET | Refills: 0 | Status: SHIPPED | OUTPATIENT
Start: 2025-05-05 | End: 2025-06-04

## 2025-05-05 NOTE — PROGRESS NOTES
"Subjective   Patient ID: Sienna Cabrera is a 29 y.o. female who presents for   Chief Complaint   Patient presents with    Med Management   Covid vax: x 6  Flu: declined     Pap: 2024  Lmp: 25  Dad , grieving      \[facility]\""  Problem List[1]    Surgical History[2]    Review of Systems  This patient has  NO history of seizures/ CAD or CVA    NO history of recent Covid nor flu symptoms,    NO Fever nor chills today,    NO Chest pain, shortness of breath nor paroxysmal nocturnal dyspnea,  NO Nausea, vomiting, nor diarrhea,  NO Hematochezia nor melena,  NO Dysuria, hematuria, nor new incontinence issues  NO new severe headaches nor neurological complaints,  NO new issues with anxiety nor depression nor new psychiatric complaints,  NO suicidal nor homicidal ideations.     OBJECTIVE:  Ht 1.727 m (5' 8\")   LMP 2025   BMI 29.04 kg/m²      General:  alert, oriented, no acute distress.  No obvious skin rashes noted.   No gait disturbance noted/baseline gait.    Mood is pleasant,  no signs of emotional distress. Tearful when discussing dad's death    Not appearing intoxicated or altered.   No voiced delusions,   Normal, appropriate behavior.    HEENT: Normocephalic, atraumatic,   Pupils round, reactive to light  Extraocular motions intact and wnl  Tympanic membranes normal    Neck: no nuchal rigidity  No masses palpable.  No carotid bruits.  No thyromegaly.    Respiratory: Equal breath sounds  No wheezes,    rales,    nor rhonchi  No respiratory distress.    Heart: Regular rate and rhythm, no    murmurs  no rubs/gallops    Abdomen: no masses palpable, nontender, no rebound nor guarding.    Extremities: NO cyanosis noted, no clubbing.   No edema noted.  2+dorsalis pedis pulses.    Normal-not antalgic, steady gait.    Office Visit on 2025   Component Date Value Ref Range Status    Amphetamines 2025 NEGATIVE  <500 ng/mL Final    Barbiturates 2025 NEGATIVE  <300 ng/mL Final    Benzodiazepines " 02/17/2025 NEGATIVE  <100 ng/mL Final    Cocaine Metabolite 02/17/2025 NEGATIVE  <150 ng/mL Final    Marijuana Metabolite 02/17/2025 NEGATIVE  <20 ng/mL Final    Methadone Metabolite 02/17/2025 NEGATIVE  <100 ng/mL Final    Opiates 02/17/2025 NEGATIVE  <100 ng/mL Final    Oxycodone 02/17/2025 NEGATIVE  <100 ng/mL Final    Phencyclidine 02/17/2025 NEGATIVE  <25 ng/mL Final    Creatinine 02/17/2025 93.1  > or = 20.0 mg/dL Final    pH 02/17/2025 7.7  4.5 - 9.0 Final    Oxidant 02/17/2025 NEGATIVE  <200 mcg/mL Final    Notes and Comments 02/17/2025    Final    Comment: This drug testing is for medical treatment only.  Analysis was performed as non-forensic testing and  these results should be used only by healthcare  providers to render diagnosis or treatment, or to  monitor progress of medical conditions.        Healthcare Providers needing Interpretation assistance,   please contact us at [phone] ([phone])   M-F, 8am to 10pm EST          Assessment/Plan     Problem List Items Addressed This Visit       Anxiety and depression    Relevant Medications    venlafaxine XR (Effexor-XR) 150 mg 24 hr capsule    Other Relevant Orders    Follow Up In Primary Care    Attention deficit hyperactivity disorder (ADHD), predominantly inattentive type    Relevant Medications    methylphenidate ER 36 mg extended release tablet    venlafaxine XR (Effexor-XR) 150 mg 24 hr capsule    Other Relevant Orders    Follow Up In Primary Care    Dysautonomia (Multi)    Relevant Orders    [facility]erral to [facility]    Gastroesophageal reflux disease    Relevant Medications    omeprazole (PriLOSEC) 20 mg DR capsule    Mild persistent asthma without complication ([facility])    Post covid-19 condition, unspecified    Relevant Orders    Referral to [facility] [employer] [facility]   Grieving  Offered benzo  In counseling  Has xanax -avoid combo w stimulant within 6-8hrs  Reqs rexulti or similar This medications risks, benefits, and alternatives were  discussed with patient at length.  If any unwanted side effects occur-discontinue medicine and call the office for discussion.  Tried wellbutrin didn't like  Dad chf-pt 's 2022 echo ok  Overuse and abuse potential discussed with patient (parents if applicable)  If mood deterioration, status change etc on medicine, suicidal or homicidal ideations -patient agrees to proceed with crisis plan-in place-including-not limited to contacting family, our office and or proceeding to ER.  Reviewed controlled substance agreement - including but not limited to the risks-benefits-alternatives to treatment with a controlled substance medication(s).  It is recommended that [place] reports be checked and patient have appointment at least every 3months(6 for certain medicines only)  If the agreement signed (controlled substance agreement) is violated prescriptions may be stopped abruptly and patient /family understands and agrees to this.  Another reason for termination of agreement is if we have concern for abuse or overuse and it is also recommended that patient take responsibility to try to taper and minimize use of these medicines frequently trying to limit or gradually taper to discontinuation.    Patient is aware that side effects such as insomnia, unexpected weight changes are unexpected and should result in discontinuation.  Always use caution and AVOID operating machinery(driving etc) on pain medicines or CNS depressants and avoid combining together OR with alcohol. If opioids are prescribed patient understands the benefits of narcan and was offered prescription.  Follow up sooner if condition deteriorates or problems arise.  Reviewed controlled substances agreement including but not limited to the benefits-risks and alternatives to treatment with a controlled substance medication.    Agrees to regular follow and counseling for maximum benefits.  In counseling      Sienna Cabrera -be aware that any referrals discussed should  be placed today or tests/labs ordered should result in prompt scheduling today.   If not done today-then a phone call for scheduling is expected in a timely manner(within 2 weeks).   If testing is to be done-a result should be available to patient within 2 weeks time unless otherwise specified.   You, the patient or caregiver, are responsible for making sure what was discussed is actually scheduled and completed.  If suboptimal understanding of results of tests or referral reason-a follow up appointment with me should be made.  If above does NOT occur-you are to connect with us for an explanation.    Follow up at next scheduled visit -as planned or directed today.  Sooner if new or unresolved issues of concern.    Sienna Cabrera We know you have a choice for your health care, THANK YOU for choosing  and [facility].  We APPRECIATE YOU.  Sincerely,   Felipa Freeman MD   (dr. 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    Hypotension    Impaired cognition    Abnormal gait    Inappropriate sinus tachycardia (CMS-HCC)    Insomnia due to medical condition    Macrosomia (HHS-HCC)    Memory loss of unknown cause    Mild persistent asthma without complication (HHS-HCC)    Numbness and tingling    Obesity    Overweight with body mass index (BMI) 25.0-29.9    Palpitations    Poor concentration    Post covid-19 condition, unspecified    Shortness of breath    Tendinitis of right rotator cuff    Vitamin D deficiency    Paresis of lower extremity    Hypokalemia    Medication course changed    Sterilization   [2]   Past Surgical History:  Procedure Laterality  Date    MR HEAD ANGIO WO IV CONTRAST  12/08/2021    MR HEAD ANGIO WO IV CONTRAST 12/8/2021 [facility]    MR NECK ANGIO WO IV CONTRAST  12/08/2021    MR NECK ANGIO WO IV CONTRAST 12/8/2021 [facility]    TONSILLECTOMY      with adenoidectomy    WISDOM TOOTH EXTRACTION

## 2025-06-03 DIAGNOSIS — I47.11 INAPPROPRIATE SINUS TACHYCARDIA: ICD-10-CM

## 2025-06-03 PROCEDURE — RXMED WILLOW AMBULATORY MEDICATION CHARGE

## 2025-06-03 RX ORDER — IVABRADINE 5 MG/1
5 TABLET, FILM COATED ORAL 2 TIMES DAILY
Qty: 60 TABLET | Refills: 11 | Status: SHIPPED | OUTPATIENT
Start: 2025-06-03 | End: 2026-06-03

## 2025-06-09 ENCOUNTER — PHARMACY VISIT (OUTPATIENT)
Dept: PHARMACY | Facility: CLINIC | Age: 30
End: 2025-06-09
Payer: COMMERCIAL

## 2025-07-03 ENCOUNTER — APPOINTMENT (OUTPATIENT)
Dept: INTEGRATIVE MEDICINE | Facility: CLINIC | Age: 30
End: 2025-07-03
Payer: COMMERCIAL

## 2025-07-17 ENCOUNTER — APPOINTMENT (OUTPATIENT)
Dept: INTEGRATIVE MEDICINE | Facility: CLINIC | Age: 30
End: 2025-07-17
Payer: COMMERCIAL

## 2025-08-06 ENCOUNTER — APPOINTMENT (OUTPATIENT)
Dept: OBSTETRICS AND GYNECOLOGY | Facility: CLINIC | Age: 30
End: 2025-08-06
Payer: COMMERCIAL

## 2025-08-06 VITALS
BODY MASS INDEX: 30.71 KG/M2 | HEIGHT: 68 IN | SYSTOLIC BLOOD PRESSURE: 104 MMHG | DIASTOLIC BLOOD PRESSURE: 62 MMHG | WEIGHT: 202.6 LBS

## 2025-08-06 DIAGNOSIS — Z12.4 ENCOUNTER FOR SCREENING FOR CERVICAL CANCER: ICD-10-CM

## 2025-08-06 DIAGNOSIS — Z01.419 ENCNTR FOR GYN EXAM (GENERAL) (ROUTINE) W/O ABN FINDINGS: Primary | ICD-10-CM

## 2025-08-06 DIAGNOSIS — F90.0 ATTENTION DEFICIT HYPERACTIVITY DISORDER (ADHD), PREDOMINANTLY INATTENTIVE TYPE: ICD-10-CM

## 2025-08-06 PROCEDURE — 3008F BODY MASS INDEX DOCD: CPT | Performed by: ADVANCED PRACTICE MIDWIFE

## 2025-08-06 PROCEDURE — 87626 HPV SEP HI-RSK TYP&POOL RSLT: CPT

## 2025-08-06 PROCEDURE — 99395 PREV VISIT EST AGE 18-39: CPT | Performed by: ADVANCED PRACTICE MIDWIFE

## 2025-08-06 PROCEDURE — 1036F TOBACCO NON-USER: CPT | Performed by: ADVANCED PRACTICE MIDWIFE

## 2025-08-06 RX ORDER — CLINDAMYCIN PHOSPHATE AND TRETINOIN GEL 1.2%/0.025% 10; .25 MG/G; MG/G
0.25 GEL TOPICAL NIGHTLY
COMMUNITY

## 2025-08-06 RX ORDER — SPIRONOLACTONE 50 MG/1
TABLET, FILM COATED ORAL
COMMUNITY
Start: 2025-05-06

## 2025-08-06 RX ORDER — TRETINOIN 0.25 MG/G
CREAM TOPICAL
COMMUNITY
Start: 2025-05-06

## 2025-08-06 RX ORDER — METHYLPHENIDATE HYDROCHLORIDE 36 MG/1
36 TABLET ORAL EVERY MORNING
Qty: 30 TABLET | Refills: 0 | Status: SHIPPED | OUTPATIENT
Start: 2025-08-06

## 2025-08-06 ASSESSMENT — PATIENT HEALTH QUESTIONNAIRE - PHQ9
2. FEELING DOWN, DEPRESSED OR HOPELESS: NOT AT ALL
SUM OF ALL RESPONSES TO PHQ9 QUESTIONS 1 & 2: 0
1. LITTLE INTEREST OR PLEASURE IN DOING THINGS: NOT AT ALL

## 2025-08-06 NOTE — PROGRESS NOTES
"GYNECOLOGY PROGRESS NOTE        CC:  see below. Patient is early for her annual exam/pap. Patient wants to proceed with her annual exam today anyways due to concerns with family hx of cancers. Menses are heavier after her tubal ligation. Her menses was 1 week late last month which was concerning for her. No concerns about infections. No breast issues.   Chief Complaint   Patient presents with    Gynecologic Exam     Patient is here today for annual PAP. Patient complains of severe heavy periods since having tubal 9/11/2024. She states she is going through 1 super plus tampons every hour. Denies any itching, burning, odor or discharge.   LMP: 7/16/2025  Last PAP 8/2025- Normal but has history of abnormal in the past.         HPI:  Sienna Cabrera is here for a routine GYN examination.  No GYN c/o, no AUB.        Contraception:  Tubal  Pregnancy plans:  none  Gardasil:  no          ROS:  GI - no blood in BMs  URO - no hematuria  GYN - no  vaginal discharge, heavier menses after tubal was done  PSYCH - mood OK        PHYSICAL EXAM:  /62 (BP Location: Right arm, Patient Position: Sitting)   Ht 1.727 m (5' 8\")   Wt 91.9 kg (202 lb 9.6 oz)   LMP 07/16/2025 (Exact Date)   BMI 30.81 kg/m²   GEN:  A&O, NAD  URO:  normal urethra, no bladder TTP  GYN:  normal vulva and perineum w/o lesions or ulcers, normal vagina without discharge or lesions, normal cervix without lesions or discharge or CMT, uterus NT/NE, adnexa mobile and NT/NE. Mons with multiple folliculitis of the hair follicles noted with shaving. All healed over. No drainage noted.  BREAST:  no masses or TTP, no skin lesions or nipple discharge  PSYCH:  normal affect, non-anxious      PATH RESULTS:  Result Date Procedure Results Follow-ups   8/19/2024 THINPREP PAP TEST Perform HPV HR test?: Always (all interpretations)  Include HPV Genotype?: Yes  LMP: 8/13/2024  Full result values not displayed (4)See result report for details           IMPRESSION/PLAN:  A: " normal gyn exam. Folliculitis noted along the mons  Plan: 1. Pap done today. 2. Follow up 1 year or prn.  Education: Shaving routine to decrease folliculitis. Pap screening recommendations.  Problem List Items Addressed This Visit    None      Pap and HPV normal in 8/2024,  no Hx of HGSIL.   ASCCP pap smear screening guidelines reviewed with the patient.        DANNY Milan-MARY

## 2025-08-07 PROCEDURE — RXMED WILLOW AMBULATORY MEDICATION CHARGE

## 2025-08-11 ENCOUNTER — APPOINTMENT (OUTPATIENT)
Dept: PRIMARY CARE | Facility: CLINIC | Age: 30
End: 2025-08-11
Payer: COMMERCIAL

## 2025-08-11 VITALS
WEIGHT: 202 LBS | BODY MASS INDEX: 30.62 KG/M2 | HEIGHT: 68 IN | SYSTOLIC BLOOD PRESSURE: 106 MMHG | TEMPERATURE: 97.1 F | OXYGEN SATURATION: 97 % | DIASTOLIC BLOOD PRESSURE: 60 MMHG | RESPIRATION RATE: 16 BRPM | HEART RATE: 112 BPM

## 2025-08-11 DIAGNOSIS — F32.A ANXIETY AND DEPRESSION: ICD-10-CM

## 2025-08-11 DIAGNOSIS — J45.30 MILD PERSISTENT ASTHMA WITHOUT COMPLICATION (HHS-HCC): ICD-10-CM

## 2025-08-11 DIAGNOSIS — F90.0 ATTENTION DEFICIT HYPERACTIVITY DISORDER (ADHD), PREDOMINANTLY INATTENTIVE TYPE: ICD-10-CM

## 2025-08-11 DIAGNOSIS — F41.9 ANXIETY AND DEPRESSION: ICD-10-CM

## 2025-08-11 DIAGNOSIS — G90.1 DYSAUTONOMIA (MULTI): ICD-10-CM

## 2025-08-11 PROCEDURE — 99214 OFFICE O/P EST MOD 30 MIN: CPT | Performed by: FAMILY MEDICINE

## 2025-08-11 PROCEDURE — 1036F TOBACCO NON-USER: CPT | Performed by: FAMILY MEDICINE

## 2025-08-11 PROCEDURE — 3008F BODY MASS INDEX DOCD: CPT | Performed by: FAMILY MEDICINE

## 2025-08-11 RX ORDER — METHYLPHENIDATE HYDROCHLORIDE 36 MG/1
36 TABLET ORAL EVERY MORNING
Qty: 30 TABLET | Refills: 0 | Status: SHIPPED | OUTPATIENT
Start: 2025-08-11 | End: 2025-08-11 | Stop reason: WASHOUT

## 2025-08-11 RX ORDER — VENLAFAXINE HYDROCHLORIDE 75 MG/1
75 CAPSULE, EXTENDED RELEASE ORAL DAILY
Qty: 90 CAPSULE | Refills: 3 | Status: SHIPPED | OUTPATIENT
Start: 2025-08-11 | End: 2025-10-10

## 2025-08-11 RX ORDER — METHYLPHENIDATE HYDROCHLORIDE 27 MG/1
27 TABLET ORAL EVERY MORNING
Qty: 30 TABLET | Refills: 0 | Status: SHIPPED | OUTPATIENT
Start: 2025-08-11 | End: 2025-09-10

## 2025-08-11 RX ORDER — VENLAFAXINE HYDROCHLORIDE 150 MG/1
150 CAPSULE, EXTENDED RELEASE ORAL DAILY
Qty: 90 CAPSULE | Refills: 3 | Status: SHIPPED | OUTPATIENT
Start: 2025-08-11

## 2025-08-11 RX ORDER — VENLAFAXINE HYDROCHLORIDE 150 MG/1
300 CAPSULE, EXTENDED RELEASE ORAL DAILY
Qty: 180 CAPSULE | Refills: 3 | Status: SHIPPED | OUTPATIENT
Start: 2025-08-11 | End: 2025-08-11 | Stop reason: SDUPTHER

## 2025-08-11 ASSESSMENT — PATIENT HEALTH QUESTIONNAIRE - PHQ9
9. THOUGHTS THAT YOU WOULD BE BETTER OFF DEAD, OR OF HURTING YOURSELF: NOT AT ALL
1. LITTLE INTEREST OR PLEASURE IN DOING THINGS: SEVERAL DAYS
4. FEELING TIRED OR HAVING LITTLE ENERGY: MORE THAN HALF THE DAYS
3. TROUBLE FALLING OR STAYING ASLEEP OR SLEEPING TOO MUCH: MORE THAN HALF THE DAYS
6. FEELING BAD ABOUT YOURSELF - OR THAT YOU ARE A FAILURE OR HAVE LET YOURSELF OR YOUR FAMILY DOWN: MORE THAN HALF THE DAYS
7. TROUBLE CONCENTRATING ON THINGS, SUCH AS READING THE NEWSPAPER OR WATCHING TELEVISION: MORE THAN HALF THE DAYS
5. POOR APPETITE OR OVEREATING: MORE THAN HALF THE DAYS
SUM OF ALL RESPONSES TO PHQ QUESTIONS 1-9: 15
SUM OF ALL RESPONSES TO PHQ9 QUESTIONS 1 AND 2: 4
8. MOVING OR SPEAKING SO SLOWLY THAT OTHER PEOPLE COULD HAVE NOTICED. OR THE OPPOSITE, BEING SO FIGETY OR RESTLESS THAT YOU HAVE BEEN MOVING AROUND A LOT MORE THAN USUAL: SEVERAL DAYS
2. FEELING DOWN, DEPRESSED OR HOPELESS: NEARLY EVERY DAY

## 2025-08-12 ENCOUNTER — TELEPHONE (OUTPATIENT)
Dept: PRIMARY CARE | Facility: CLINIC | Age: 30
End: 2025-08-12
Payer: COMMERCIAL

## 2025-08-12 DIAGNOSIS — F41.9 ANXIETY AND DEPRESSION: ICD-10-CM

## 2025-08-12 DIAGNOSIS — F32.A ANXIETY AND DEPRESSION: ICD-10-CM

## 2025-08-14 ENCOUNTER — PHARMACY VISIT (OUTPATIENT)
Dept: PHARMACY | Facility: CLINIC | Age: 30
End: 2025-08-14
Payer: COMMERCIAL

## 2025-08-14 ENCOUNTER — OFFICE VISIT (OUTPATIENT)
Dept: NEUROLOGY | Facility: HOSPITAL | Age: 30
End: 2025-08-14
Payer: COMMERCIAL

## 2025-08-14 VITALS
DIASTOLIC BLOOD PRESSURE: 72 MMHG | HEART RATE: 105 BPM | SYSTOLIC BLOOD PRESSURE: 112 MMHG | BODY MASS INDEX: 30.62 KG/M2 | WEIGHT: 202 LBS | HEIGHT: 68 IN | TEMPERATURE: 97.1 F | RESPIRATION RATE: 18 BRPM

## 2025-08-14 DIAGNOSIS — R00.2 PALPITATIONS: ICD-10-CM

## 2025-08-14 DIAGNOSIS — G62.9 SMALL FIBER NEUROPATHY: ICD-10-CM

## 2025-08-14 PROCEDURE — 99205 OFFICE O/P NEW HI 60 MIN: CPT | Performed by: PSYCHIATRY & NEUROLOGY

## 2025-08-14 PROCEDURE — 99417 PROLNG OP E/M EACH 15 MIN: CPT | Performed by: PSYCHIATRY & NEUROLOGY

## 2025-08-14 PROCEDURE — 99212 OFFICE O/P EST SF 10 MIN: CPT

## 2025-08-14 PROCEDURE — 3008F BODY MASS INDEX DOCD: CPT | Performed by: PSYCHIATRY & NEUROLOGY

## 2025-08-14 ASSESSMENT — PATIENT HEALTH QUESTIONNAIRE - PHQ9
SUM OF ALL RESPONSES TO PHQ9 QUESTIONS 1 AND 2: 0
1. LITTLE INTEREST OR PLEASURE IN DOING THINGS: NOT AT ALL
2. FEELING DOWN, DEPRESSED OR HOPELESS: NOT AT ALL

## 2025-08-14 ASSESSMENT — PAIN SCALES - GENERAL: PAINLEVEL_OUTOF10: 0-NO PAIN

## 2025-08-18 ENCOUNTER — RESULTS FOLLOW-UP (OUTPATIENT)
Dept: OBSTETRICS AND GYNECOLOGY | Facility: CLINIC | Age: 30
End: 2025-08-18

## 2025-08-18 ENCOUNTER — APPOINTMENT (OUTPATIENT)
Dept: PRIMARY CARE | Facility: CLINIC | Age: 30
End: 2025-08-18
Payer: COMMERCIAL

## 2025-08-18 LAB
CYTOLOGY CMNT CVX/VAG CYTO-IMP: NORMAL
HPV HR 12 DNA GENITAL QL NAA+PROBE: POSITIVE
HPV HR GENOTYPES PNL CVX NAA+PROBE: POSITIVE
HPV16 DNA SPEC QL NAA+PROBE: NEGATIVE
HPV18 DNA SPEC QL NAA+PROBE: NEGATIVE
LAB AP HPV GENOTYPE QUESTION: YES
LAB AP HPV HR: NORMAL
LABORATORY COMMENT REPORT: NORMAL
LMP START DATE: NORMAL
PATH REPORT.TOTAL CANCER: NORMAL

## 2025-08-20 ENCOUNTER — PROCEDURE VISIT (OUTPATIENT)
Dept: OBSTETRICS AND GYNECOLOGY | Facility: CLINIC | Age: 30
End: 2025-08-20
Payer: COMMERCIAL

## 2025-08-20 VITALS
HEIGHT: 68 IN | DIASTOLIC BLOOD PRESSURE: 82 MMHG | WEIGHT: 201.3 LBS | BODY MASS INDEX: 30.51 KG/M2 | SYSTOLIC BLOOD PRESSURE: 110 MMHG

## 2025-08-20 DIAGNOSIS — R87.810 CERVICAL HIGH RISK HPV (HUMAN PAPILLOMAVIRUS) TEST POSITIVE: ICD-10-CM

## 2025-08-20 DIAGNOSIS — R87.612 LGSIL ON PAP SMEAR OF CERVIX: Primary | ICD-10-CM

## 2025-08-20 PROBLEM — Z30.2 STERILIZATION: Status: RESOLVED | Noted: 2024-08-19 | Resolved: 2025-08-20

## 2025-08-20 LAB — PREGNANCY TEST URINE, POC: NEGATIVE

## 2025-08-20 PROCEDURE — 81025 URINE PREGNANCY TEST: CPT | Performed by: OBSTETRICS & GYNECOLOGY

## 2025-08-22 PROCEDURE — RXMED WILLOW AMBULATORY MEDICATION CHARGE

## 2025-08-25 ENCOUNTER — PHARMACY VISIT (OUTPATIENT)
Dept: PHARMACY | Facility: CLINIC | Age: 30
End: 2025-08-25
Payer: COMMERCIAL

## 2025-08-29 LAB
LAB AP ASR DISCLAIMER: NORMAL
LABORATORY COMMENT REPORT: NORMAL
PATH REPORT.COMMENTS IMP SPEC: NORMAL
PATH REPORT.FINAL DX SPEC: NORMAL
PATH REPORT.GROSS SPEC: NORMAL
PATH REPORT.RELEVANT HX SPEC: NORMAL
PATH REPORT.TOTAL CANCER: NORMAL

## 2025-09-05 ENCOUNTER — LAB (OUTPATIENT)
Dept: LAB | Facility: HOSPITAL | Age: 30
End: 2025-09-05
Payer: COMMERCIAL

## 2025-09-05 DIAGNOSIS — G62.9 POLYNEUROPATHY, UNSPECIFIED: Primary | ICD-10-CM

## 2025-09-05 DIAGNOSIS — R00.2 PALPITATIONS: ICD-10-CM

## 2025-09-05 PROCEDURE — 84155 ASSAY OF PROTEIN SERUM: CPT

## 2025-09-06 LAB
3OH-BUTYRCARN SERPL-SCNC: NORMAL UMOL/L
3OH-DODECANOYLCARN SERPL-SCNC: NORMAL UMOL/L
3OH-HEXANOYLCARN SERPL-SCNC: NORMAL UMOL/L
3OH-ISOVALERYLCARN SERPL-SCNC: NORMAL UMOL/L
3OH-LINOLEOYLCARN SERPL-SCNC: NORMAL UMOL/L
3OH-OLEOYLCARN SERPL-SCNC: NORMAL UMOL/L
3OH-PALMITOLEYLCARN SERPL-SCNC: NORMAL UMOL/L
3OH-PALMITOYLCARN SERPL-SCNC: NORMAL UMOL/L
3OH-TDECANOYLCARN SERPL-SCNC: NORMAL UMOL/L
3OH-TDECENOYLCARN SERPL-SCNC: NORMAL UMOL/L
A-TOCOPHEROL VIT E SERPL-MCNC: NORMAL
ACETYLCARN SERPL-SCNC: NORMAL UMOL/L
ACYLCARNITINE PATTERN SERPL-IMP: NORMAL
ADIPOYLCARN SERPL-SCNC: NORMAL NMOL/ML
ALDOLASE SERPL-CCNC: NORMAL
ANA SER QL IF: NORMAL
ANION GAP SERPL CALCULATED.4IONS-SCNC: 16 MMOL/L (CALC) (ref 7–17)
BETA+GAMMA TOCOPHEROL SERPL-MCNC: NORMAL MG/DL
BUN SERPL-MCNC: 14 MG/DL (ref 7–25)
BUN/CREAT SERPL: NORMAL (CALC) (ref 6–22)
CALCIUM SERPL-MCNC: 9.2 MG/DL (ref 8.6–10.2)
CARN ESTERS SERPL-SCNC: NORMAL UMOL/L
CARN ESTERS/C0 SERPL-SRTO: NORMAL {RATIO}
CARNITINE FREE SERPL-SCNC: NORMAL UMOL/L
CARNITINE SERPL-SCNC: NORMAL UMOL/L
CATECHOLS PLAS-MCNC: NORMAL PG/ML
CENTROMERE B AB SER-ACNC: NORMAL
CERULOPLASMIN SERPL-MCNC: NORMAL MG/DL
CHLORIDE SERPL-SCNC: 103 MMOL/L (ref 98–110)
CK SERPL-CCNC: 59 U/L (ref 20–239)
CO2 SERPL-SCNC: 22 MMOL/L (ref 20–32)
COPPER BLD-MCNC: NORMAL UG/DL
CREAT SERPL-MCNC: 0.7 MG/DL (ref 0.5–0.97)
CRP SERPL-MCNC: NORMAL MG/L
CRYOGLOB SER QL: NORMAL
DECADIENOYLCARN SERPL-SCNC: NORMAL UMOL/L
DECANOYLCARN SERPL-SCNC: NORMAL UMOL/L
DECENOYLCARN SERPL-SCNC: NORMAL UMOL/L
DODECANOYLCARN SERPL-SCNC: NORMAL UMOL/L
DODECENOYLCARN SERPL-SCNC: NORMAL UMOL/L
DOPAMINE SERPL-MCNC: NORMAL PG/ML
DSDNA AB SER-ACNC: NORMAL [IU]/ML
EGFRCR SERPLBLD CKD-EPI 2021: 119 ML/MIN/1.73M2
ENA JO1 AB SER IA-ACNC: NORMAL
ENA RNP AB SER-ACNC: NORMAL
ENA SCL70 AB SER IA-ACNC: NORMAL
ENA SM AB SER IA-ACNC: NORMAL
ENA SM+RNP AB SER IA-ACNC: NORMAL
ENA SS-A AB SER IA-ACNC: NORMAL
ENA SS-B AB SER IA-ACNC: NORMAL
EPINEPH PLAS-MCNC: NORMAL PG/ML
ERYTHROCYTE [DISTWIDTH] IN BLOOD BY AUTOMATED COUNT: 12.9 % (ref 11–15)
ERYTHROCYTE [SEDIMENTATION RATE] IN BLOOD BY WESTERGREN METHOD: 11 MM/H
GLIADIN IGA SER IA-ACNC: NORMAL
GLIADIN IGG SER IA-ACNC: NORMAL
GLUCOSE SERPL-MCNC: 109 MG/DL (ref 65–139)
GLUTARYLCARN SERPL-SCNC: NORMAL UMOL/L
HCT VFR BLD AUTO: 41.3 % (ref 35–45)
HCYS SERPL-SCNC: NORMAL UMOL/L
HEXANOYLCARN SERPL-SCNC: NORMAL UMOL/L
HGB BLD-MCNC: 13.2 G/DL (ref 11.7–15.5)
IGA SERPL-MCNC: NORMAL MG/DL
ISOBUTYRYLCARN SERPL-SCNC: NORMAL UMOL/L
ISOVALERYL+MEBUTYRYLCARN SERPL-SCNC: NORMAL UMOL/L
LINOLEOYLCARN SERPL-SCNC: NORMAL UMOL/L
M PNEUMO IGG SER IA-ACNC: NORMAL
M PNEUMO IGM SER IA-ACNC: NORMAL
MALONYLCARN SERPL-SCNC: NORMAL UMOL/L
MCH RBC QN AUTO: 27.1 PG (ref 27–33)
MCHC RBC AUTO-ENTMCNC: 32 G/DL (ref 32–36)
MCV RBC AUTO: 84.8 FL (ref 80–100)
ME-MALONYLCARN SERPL-SCNC: NORMAL UMOL/L
METHYLMALONATE SERPL-SCNC: NORMAL
NOREPINEPH PLAS-MCNC: NORMAL PG/ML
NUCLEOSOME AB SER IA-ACNC: NORMAL
OCTANOYLCARN SERPL-SCNC: NORMAL UMOL/L
OCTENOYLCARN SERPL-SCNC: NORMAL UMOL/L
OLEOYLCARN SERPL-SCNC: NORMAL UMOL/L
PALMITOLEYLCARN SERPL-SCNC: NORMAL UMOL/L
PALMITOYLCARN SERPL-SCNC: NORMAL UMOL/L
PCA AB SER-ACNC: NORMAL
PLATELET # BLD AUTO: 314 THOUSAND/UL (ref 140–400)
PMV BLD REES-ECKER: 9.9 FL (ref 7.5–12.5)
POTASSIUM SERPL-SCNC: 4 MMOL/L (ref 3.5–5.3)
PROPIONYLCARN SERPL-SCNC: NORMAL UMOL/L
PROT SERPL-MCNC: 6.7 G/DL (ref 6.4–8.2)
PYRIDOXAL PHOS SERPL-MCNC: NORMAL UG/L
RBC # BLD AUTO: 4.87 MILLION/UL (ref 3.8–5.1)
RHEUMATOID FACT SERPL-ACNC: NORMAL [IU]/ML
RIBOSOMAL P AB SER-ACNC: NORMAL
SODIUM SERPL-SCNC: 141 MMOL/L (ref 135–146)
STEAROYLCARN SERPL-SCNC: NORMAL UMOL/L
SUBERYLCARN SERPL-SCNC: NORMAL UMOL/L
T3FREE SERPL-MCNC: 3.5 PG/ML (ref 2.3–4.2)
T4 FREE SERPL-MCNC: 1.2 NG/DL (ref 0.8–1.8)
TDECADIENOYLCARN SERPL-SCNC: NORMAL UMOL/L
TDECANOYLCARN SERPL-SCNC: NORMAL UMOL/L
TDECENOYLCARN SERPL-SCNC: NORMAL UMOL/L
TGLY+MTHYL (C5:1) SERPL-SCNC: NORMAL UMOL/L
THYROPEROXIDASE AB SERPL-ACNC: NORMAL [IU]/ML
TSH SERPL-ACNC: 1.32 MIU/L
TTG IGA SER-ACNC: NORMAL
TTG IGG SER-ACNC: NORMAL
VIT B12 SERPL-MCNC: 349 PG/ML (ref 200–1100)
WBC # BLD AUTO: 6.7 THOUSAND/UL (ref 3.8–10.8)

## 2025-09-08 ENCOUNTER — APPOINTMENT (OUTPATIENT)
Facility: CLINIC | Age: 30
End: 2025-09-08
Payer: COMMERCIAL

## 2025-09-17 ENCOUNTER — APPOINTMENT (OUTPATIENT)
Dept: OBSTETRICS AND GYNECOLOGY | Facility: CLINIC | Age: 30
End: 2025-09-17
Payer: COMMERCIAL

## 2025-10-06 ENCOUNTER — APPOINTMENT (OUTPATIENT)
Dept: OBSTETRICS AND GYNECOLOGY | Facility: CLINIC | Age: 30
End: 2025-10-06
Payer: COMMERCIAL

## 2025-11-14 ENCOUNTER — APPOINTMENT (OUTPATIENT)
Dept: PRIMARY CARE | Facility: CLINIC | Age: 30
End: 2025-11-14
Payer: COMMERCIAL

## (undated) DEVICE — Device

## (undated) DEVICE — PREP, SCRUB, SKIN, FOAM, HIBICLENS, 4 OZ

## (undated) DEVICE — TOWEL PACK, STERILE, 16X24, XRAY DETECTABLE, BLUE, 4/PK

## (undated) DEVICE — GOWN, SURGICAL, ROYAL SILK, XL, STERILE

## (undated) DEVICE — GOWN, SURGICAL, ROYAL SILK, LG, STERILE

## (undated) DEVICE — GLOVE, SURGICAL, PROTEXIS PI , 7.0, PF, LF

## (undated) DEVICE — LIGASURE, SEALER/DIVIDER MARYLAND JAW, 5MM

## (undated) DEVICE — WARMER, DUAL SCOPE

## (undated) DEVICE — PUMP, STRYKERFLOW 2 & HANDPIECE W/10FT. IRRIGATION TUBING

## (undated) DEVICE — SUTURE, VICRYL, 0, 27 IN, UR-6, VIOLET

## (undated) DEVICE — GLOVE, POLYISOPRENE, SZ 6.5, PF,LF

## (undated) DEVICE — DRAPE, LEGGINGS, 28.5 X 43 IN, DISPOSABLE, LF, STERILE

## (undated) DEVICE — DRAPE PACK, LAVH, W/ATTACHED LEGGINGS, W/POUCH, 100 X 114 IN, LF, STERILE

## (undated) DEVICE — PAD, SANITARY, OBSTETRICAL, W/ADHSV STRIP,11 IN,LF

## (undated) DEVICE — POSITIONING SYSTEM, PAGAZZI, PATIENT

## (undated) DEVICE — TRAY, DRY PREP, PREMIUM

## (undated) DEVICE — NEEDLE, SAFETY, 25 GA X 1.5 IN

## (undated) DEVICE — SUTURE, VICRYL, 3-0, 27 IN, CT-1, UNDYED

## (undated) DEVICE — APPLICATOR, CHLORAPREP, W/ORANGE TINT, 26ML

## (undated) DEVICE — CATHETER, URETHRAL, ALL PURPOSE, 16FR

## (undated) DEVICE — SOLUTION, IRRIGATION, USP, SODIUM CHLORIDE 0.9%, 3000 ML

## (undated) DEVICE — MANIFOLD, 4 PORT NEPTUNE STANDARD

## (undated) DEVICE — HOLSTER, JET SAFETY

## (undated) DEVICE — SUTURE, MONOCRYL, 4-0, 27 IN, PS-2, UNDYED

## (undated) DEVICE — SOLUTION KIT, ANTIFOG, 6CC, LF